# Patient Record
Sex: MALE | Race: WHITE | NOT HISPANIC OR LATINO | URBAN - METROPOLITAN AREA
[De-identification: names, ages, dates, MRNs, and addresses within clinical notes are randomized per-mention and may not be internally consistent; named-entity substitution may affect disease eponyms.]

---

## 2022-07-19 ENCOUNTER — HOSPITAL ENCOUNTER (INPATIENT)
Facility: HOSPITAL | Age: 57
LOS: 1 days | Discharge: HOME | DRG: 433 | End: 2022-07-23
Attending: EMERGENCY MEDICINE | Admitting: HOSPITALIST
Payer: MEDICARE

## 2022-07-19 DIAGNOSIS — K74.60 HEPATIC CIRRHOSIS, UNSPECIFIED HEPATIC CIRRHOSIS TYPE, UNSPECIFIED WHETHER ASCITES PRESENT: ICD-10-CM

## 2022-07-19 DIAGNOSIS — F10.11 HISTORY OF ALCOHOL ABUSE: ICD-10-CM

## 2022-07-19 DIAGNOSIS — K74.60 DECOMPENSATED HEPATIC CIRRHOSIS: Primary | ICD-10-CM

## 2022-07-19 DIAGNOSIS — R18.8 ASCITES OF LIVER: ICD-10-CM

## 2022-07-19 DIAGNOSIS — K72.90 LIVER FAILURE: ICD-10-CM

## 2022-07-19 DIAGNOSIS — K72.90 DECOMPENSATED HEPATIC CIRRHOSIS: Primary | ICD-10-CM

## 2022-07-19 DIAGNOSIS — R06.02 SOB (SHORTNESS OF BREATH): ICD-10-CM

## 2022-07-19 LAB
ALBUMIN SERPL BCP-MCNC: 2.9 G/DL (ref 3.5–5.2)
ALP SERPL-CCNC: 432 U/L (ref 55–135)
ALT SERPL W/O P-5'-P-CCNC: 24 U/L (ref 10–44)
ANION GAP SERPL CALC-SCNC: 12 MMOL/L (ref 8–16)
AST SERPL-CCNC: 81 U/L (ref 10–40)
BASOPHILS # BLD AUTO: 0.05 K/UL (ref 0–0.2)
BASOPHILS NFR BLD: 0.9 % (ref 0–1.9)
BILIRUB SERPL-MCNC: 2.2 MG/DL (ref 0.1–1)
BUN SERPL-MCNC: 11 MG/DL (ref 6–20)
CALCIUM SERPL-MCNC: 9.5 MG/DL (ref 8.7–10.5)
CHLORIDE SERPL-SCNC: 91 MMOL/L (ref 95–110)
CO2 SERPL-SCNC: 30 MMOL/L (ref 23–29)
CREAT SERPL-MCNC: 0.7 MG/DL (ref 0.5–1.4)
DIFFERENTIAL METHOD: ABNORMAL
EOSINOPHIL # BLD AUTO: 0.2 K/UL (ref 0–0.5)
EOSINOPHIL NFR BLD: 3.6 % (ref 0–8)
ERYTHROCYTE [DISTWIDTH] IN BLOOD BY AUTOMATED COUNT: 22.3 % (ref 11.5–14.5)
EST. GFR  (AFRICAN AMERICAN): >60 ML/MIN/1.73 M^2
EST. GFR  (NON AFRICAN AMERICAN): >60 ML/MIN/1.73 M^2
GLUCOSE SERPL-MCNC: 98 MG/DL (ref 70–110)
HCT VFR BLD AUTO: 34.4 % (ref 40–54)
HGB BLD-MCNC: 11 G/DL (ref 14–18)
IMM GRANULOCYTES # BLD AUTO: 0.02 K/UL (ref 0–0.04)
IMM GRANULOCYTES NFR BLD AUTO: 0.4 % (ref 0–0.5)
INR PPP: 1.3 (ref 0.8–1.2)
LYMPHOCYTES # BLD AUTO: 0.4 K/UL (ref 1–4.8)
LYMPHOCYTES NFR BLD: 8.1 % (ref 18–48)
MCH RBC QN AUTO: 27.8 PG (ref 27–31)
MCHC RBC AUTO-ENTMCNC: 32 G/DL (ref 32–36)
MCV RBC AUTO: 87 FL (ref 82–98)
MONOCYTES # BLD AUTO: 1.1 K/UL (ref 0.3–1)
MONOCYTES NFR BLD: 20.3 % (ref 4–15)
NEUTROPHILS # BLD AUTO: 3.6 K/UL (ref 1.8–7.7)
NEUTROPHILS NFR BLD: 66.7 % (ref 38–73)
NRBC BLD-RTO: 0 /100 WBC
PLATELET # BLD AUTO: 220 K/UL (ref 150–450)
PMV BLD AUTO: 10.9 FL (ref 9.2–12.9)
POTASSIUM SERPL-SCNC: 3.2 MMOL/L (ref 3.5–5.1)
PROT SERPL-MCNC: 9.3 G/DL (ref 6–8.4)
PROTHROMBIN TIME: 13.8 SEC (ref 9–12.5)
RBC # BLD AUTO: 3.96 M/UL (ref 4.6–6.2)
SODIUM SERPL-SCNC: 133 MMOL/L (ref 136–145)
WBC # BLD AUTO: 5.33 K/UL (ref 3.9–12.7)

## 2022-07-19 PROCEDURE — 99285 EMERGENCY DEPT VISIT HI MDM: CPT | Mod: 25

## 2022-07-19 PROCEDURE — U0002 COVID-19 LAB TEST NON-CDC: HCPCS | Performed by: EMERGENCY MEDICINE

## 2022-07-19 PROCEDURE — 85610 PROTHROMBIN TIME: CPT | Performed by: EMERGENCY MEDICINE

## 2022-07-19 PROCEDURE — G0378 HOSPITAL OBSERVATION PER HR: HCPCS

## 2022-07-19 PROCEDURE — 85025 COMPLETE CBC W/AUTO DIFF WBC: CPT | Performed by: EMERGENCY MEDICINE

## 2022-07-19 PROCEDURE — 80053 COMPREHEN METABOLIC PANEL: CPT | Performed by: EMERGENCY MEDICINE

## 2022-07-19 PROCEDURE — 99285 EMERGENCY DEPT VISIT HI MDM: CPT | Mod: CS,,, | Performed by: EMERGENCY MEDICINE

## 2022-07-19 PROCEDURE — 99285 PR EMERGENCY DEPT VISIT,LEVEL V: ICD-10-PCS | Mod: CS,,, | Performed by: EMERGENCY MEDICINE

## 2022-07-19 PROCEDURE — 87389 HIV-1 AG W/HIV-1&-2 AB AG IA: CPT | Performed by: PHYSICIAN ASSISTANT

## 2022-07-19 PROCEDURE — 86803 HEPATITIS C AB TEST: CPT | Performed by: PHYSICIAN ASSISTANT

## 2022-07-19 RX ORDER — ACETAMINOPHEN 325 MG/1
325 TABLET ORAL EVERY 4 HOURS PRN
Status: DISCONTINUED | OUTPATIENT
Start: 2022-07-20 | End: 2022-07-23 | Stop reason: HOSPADM

## 2022-07-19 RX ORDER — NALOXONE HCL 0.4 MG/ML
0.02 VIAL (ML) INJECTION
Status: DISCONTINUED | OUTPATIENT
Start: 2022-07-20 | End: 2022-07-23 | Stop reason: HOSPADM

## 2022-07-19 RX ORDER — SODIUM CHLORIDE 0.9 % (FLUSH) 0.9 %
10 SYRINGE (ML) INJECTION
Status: DISCONTINUED | OUTPATIENT
Start: 2022-07-20 | End: 2022-07-23 | Stop reason: HOSPADM

## 2022-07-19 RX ORDER — IBUPROFEN 200 MG
24 TABLET ORAL
Status: DISCONTINUED | OUTPATIENT
Start: 2022-07-20 | End: 2022-07-23 | Stop reason: HOSPADM

## 2022-07-19 RX ORDER — IPRATROPIUM BROMIDE AND ALBUTEROL SULFATE 2.5; .5 MG/3ML; MG/3ML
3 SOLUTION RESPIRATORY (INHALATION) EVERY 6 HOURS PRN
Status: DISCONTINUED | OUTPATIENT
Start: 2022-07-20 | End: 2022-07-23 | Stop reason: HOSPADM

## 2022-07-19 RX ORDER — TALC
6 POWDER (GRAM) TOPICAL NIGHTLY PRN
Status: DISCONTINUED | OUTPATIENT
Start: 2022-07-20 | End: 2022-07-23 | Stop reason: HOSPADM

## 2022-07-19 RX ORDER — POTASSIUM CHLORIDE 20 MEQ/1
40 TABLET, EXTENDED RELEASE ORAL ONCE
Status: COMPLETED | OUTPATIENT
Start: 2022-07-20 | End: 2022-07-20

## 2022-07-19 RX ORDER — IBUPROFEN 200 MG
16 TABLET ORAL
Status: DISCONTINUED | OUTPATIENT
Start: 2022-07-20 | End: 2022-07-23 | Stop reason: HOSPADM

## 2022-07-19 NOTE — FIRST PROVIDER EVALUATION
Medical screening exam completed.  I have conducted a focused provider triage encounter, findings are as follows:    Brief history of present illness:  55 y/o h/o cirrhosis, c/o worsening abd pain and distention, from [place] - never been here before    There were no vitals filed for this visit.    Pertinent physical exam:  Comfortable, appears chronically ill, abd distended    Brief workup plan:  labs    Preliminary workup initiated; this workup will be continued and followed by the physician or advanced practice provider that is assigned to the patient when roomed.    8:49 PM  Pt reporting feeling more SOB  Repeat O2 sat 90%  Placed on oxygen  CXR ordered

## 2022-07-20 PROBLEM — R17 ELEVATED BILIRUBIN: Status: ACTIVE | Noted: 2022-07-20

## 2022-07-20 PROBLEM — E87.6 HYPOKALEMIA: Status: ACTIVE | Noted: 2022-07-20

## 2022-07-20 PROBLEM — F10.11 HISTORY OF ALCOHOL ABUSE: Status: ACTIVE | Noted: 2022-07-20

## 2022-07-20 LAB
ALBUMIN FLD-MCNC: 0.5 G/DL
ALBUMIN SERPL BCP-MCNC: 2.3 G/DL (ref 3.5–5.2)
ALP SERPL-CCNC: 341 U/L (ref 55–135)
ALT SERPL W/O P-5'-P-CCNC: 20 U/L (ref 10–44)
AMMONIA PLAS-SCNC: 103 UMOL/L (ref 10–50)
ANION GAP SERPL CALC-SCNC: 9 MMOL/L (ref 8–16)
APPEARANCE FLD: CLEAR
AST SERPL-CCNC: 72 U/L (ref 10–40)
BASOPHILS # BLD AUTO: 0.05 K/UL (ref 0–0.2)
BASOPHILS NFR BLD: 1 % (ref 0–1.9)
BILIRUB SERPL-MCNC: 1.5 MG/DL (ref 0.1–1)
BODY FLD TYPE: NORMAL
BODY FLUID SOURCE, LDH: NORMAL
BUN SERPL-MCNC: 10 MG/DL (ref 6–20)
CALCIUM SERPL-MCNC: 8.7 MG/DL (ref 8.7–10.5)
CHLORIDE SERPL-SCNC: 93 MMOL/L (ref 95–110)
CO2 SERPL-SCNC: 29 MMOL/L (ref 23–29)
COLOR FLD: YELLOW
CREAT SERPL-MCNC: 0.7 MG/DL (ref 0.5–1.4)
DIFFERENTIAL METHOD: ABNORMAL
EOSINOPHIL # BLD AUTO: 0.2 K/UL (ref 0–0.5)
EOSINOPHIL NFR BLD: 3.4 % (ref 0–8)
ERYTHROCYTE [DISTWIDTH] IN BLOOD BY AUTOMATED COUNT: 22.1 % (ref 11.5–14.5)
EST. GFR  (AFRICAN AMERICAN): >60 ML/MIN/1.73 M^2
EST. GFR  (NON AFRICAN AMERICAN): >60 ML/MIN/1.73 M^2
GLUCOSE SERPL-MCNC: 81 MG/DL (ref 70–110)
GRAM STN SPEC: NORMAL
GRAM STN SPEC: NORMAL
HCT VFR BLD AUTO: 30.3 % (ref 40–54)
HCV AB SERPL QL IA: NEGATIVE
HGB BLD-MCNC: 9.7 G/DL (ref 14–18)
HIV 1+2 AB+HIV1 P24 AG SERPL QL IA: NEGATIVE
IMM GRANULOCYTES # BLD AUTO: 0.01 K/UL (ref 0–0.04)
IMM GRANULOCYTES NFR BLD AUTO: 0.2 % (ref 0–0.5)
INR PPP: 1.4 (ref 0.8–1.2)
LDH FLD L TO P-CCNC: 55 U/L
LYMPHOCYTES # BLD AUTO: 0.4 K/UL (ref 1–4.8)
LYMPHOCYTES NFR BLD: 7.6 % (ref 18–48)
LYMPHOCYTES NFR FLD MANUAL: 37 %
MAGNESIUM SERPL-MCNC: 1.8 MG/DL (ref 1.6–2.6)
MCH RBC QN AUTO: 27.4 PG (ref 27–31)
MCHC RBC AUTO-ENTMCNC: 32 G/DL (ref 32–36)
MCV RBC AUTO: 86 FL (ref 82–98)
MESOTHL CELL NFR FLD MANUAL: 12 %
MONOCYTES # BLD AUTO: 0.9 K/UL (ref 0.3–1)
MONOCYTES NFR BLD: 18.9 % (ref 4–15)
MONOS+MACROS NFR FLD MANUAL: 50 %
NEUTROPHILS # BLD AUTO: 3.4 K/UL (ref 1.8–7.7)
NEUTROPHILS NFR BLD: 68.9 % (ref 38–73)
NEUTROPHILS NFR FLD MANUAL: 1 %
NRBC BLD-RTO: 0 /100 WBC
PLATELET # BLD AUTO: 184 K/UL (ref 150–450)
PMV BLD AUTO: 10.5 FL (ref 9.2–12.9)
POTASSIUM SERPL-SCNC: 3.1 MMOL/L (ref 3.5–5.1)
PROT FLD-MCNC: 1.1 G/DL
PROT SERPL-MCNC: 7.4 G/DL (ref 6–8.4)
PROTHROMBIN TIME: 14 SEC (ref 9–12.5)
RBC # BLD AUTO: 3.54 M/UL (ref 4.6–6.2)
SARS-COV-2 RDRP RESP QL NAA+PROBE: NEGATIVE
SODIUM SERPL-SCNC: 131 MMOL/L (ref 136–145)
SPECIMEN SOURCE: NORMAL
SPECIMEN SOURCE: NORMAL
WBC # BLD AUTO: 4.97 K/UL (ref 3.9–12.7)
WBC # FLD: 57 /CU MM

## 2022-07-20 PROCEDURE — 82042 OTHER SOURCE ALBUMIN QUAN EA: CPT | Performed by: HOSPITALIST

## 2022-07-20 PROCEDURE — G0378 HOSPITAL OBSERVATION PER HR: HCPCS

## 2022-07-20 PROCEDURE — 25000003 PHARM REV CODE 250: Performed by: HOSPITALIST

## 2022-07-20 PROCEDURE — 80321 ALCOHOLS BIOMARKERS 1OR 2: CPT | Performed by: HOSPITALIST

## 2022-07-20 PROCEDURE — 99204 PR OFFICE/OUTPT VISIT, NEW, LEVL IV, 45-59 MIN: ICD-10-PCS | Mod: GC,,, | Performed by: INTERNAL MEDICINE

## 2022-07-20 PROCEDURE — 87205 SMEAR GRAM STAIN: CPT | Performed by: HOSPITALIST

## 2022-07-20 PROCEDURE — 99220 PR INITIAL OBSERVATION CARE,LEVL III: CPT | Mod: ,,, | Performed by: HOSPITALIST

## 2022-07-20 PROCEDURE — 84157 ASSAY OF PROTEIN OTHER: CPT | Performed by: HOSPITALIST

## 2022-07-20 PROCEDURE — 87075 CULTR BACTERIA EXCEPT BLOOD: CPT | Performed by: HOSPITALIST

## 2022-07-20 PROCEDURE — 25500020 PHARM REV CODE 255: Performed by: HOSPITALIST

## 2022-07-20 PROCEDURE — 99204 OFFICE O/P NEW MOD 45 MIN: CPT | Mod: GC,,, | Performed by: INTERNAL MEDICINE

## 2022-07-20 PROCEDURE — 99220 PR INITIAL OBSERVATION CARE,LEVL III: ICD-10-PCS | Mod: ,,, | Performed by: HOSPITALIST

## 2022-07-20 PROCEDURE — 89051 BODY FLUID CELL COUNT: CPT | Performed by: HOSPITALIST

## 2022-07-20 PROCEDURE — 83615 LACTATE (LD) (LDH) ENZYME: CPT | Performed by: HOSPITALIST

## 2022-07-20 PROCEDURE — 83735 ASSAY OF MAGNESIUM: CPT | Performed by: HOSPITALIST

## 2022-07-20 PROCEDURE — 85025 COMPLETE CBC W/AUTO DIFF WBC: CPT | Performed by: HOSPITALIST

## 2022-07-20 PROCEDURE — 85610 PROTHROMBIN TIME: CPT | Performed by: HOSPITALIST

## 2022-07-20 PROCEDURE — 36415 COLL VENOUS BLD VENIPUNCTURE: CPT | Performed by: HOSPITALIST

## 2022-07-20 PROCEDURE — 80053 COMPREHEN METABOLIC PANEL: CPT | Performed by: HOSPITALIST

## 2022-07-20 PROCEDURE — 87070 CULTURE OTHR SPECIMN AEROBIC: CPT | Performed by: HOSPITALIST

## 2022-07-20 PROCEDURE — 82140 ASSAY OF AMMONIA: CPT | Performed by: HOSPITALIST

## 2022-07-20 RX ORDER — LACTULOSE 10 G/15ML
20 SOLUTION ORAL 3 TIMES DAILY
Status: DISCONTINUED | OUTPATIENT
Start: 2022-07-20 | End: 2022-07-21

## 2022-07-20 RX ORDER — SPIRONOLACTONE 50 MG/1
100 TABLET, FILM COATED ORAL DAILY
Status: DISCONTINUED | OUTPATIENT
Start: 2022-07-20 | End: 2022-07-21

## 2022-07-20 RX ORDER — OXYCODONE HYDROCHLORIDE 10 MG/1
10 TABLET ORAL
COMMUNITY
Start: 2022-07-08

## 2022-07-20 RX ORDER — PROPRANOLOL HYDROCHLORIDE 10 MG/1
TABLET ORAL
COMMUNITY

## 2022-07-20 RX ORDER — OXYCODONE HYDROCHLORIDE 10 MG/1
10 TABLET ORAL EVERY 6 HOURS PRN
Status: DISCONTINUED | OUTPATIENT
Start: 2022-07-20 | End: 2022-07-22

## 2022-07-20 RX ORDER — SPIRONOLACTONE 100 MG/1
TABLET, FILM COATED ORAL
Status: ON HOLD | COMMUNITY
End: 2022-07-23 | Stop reason: HOSPADM

## 2022-07-20 RX ORDER — LEVOTHYROXINE SODIUM 200 UG/1
TABLET ORAL
COMMUNITY

## 2022-07-20 RX ORDER — LANOLIN ALCOHOL/MO/W.PET/CERES
CREAM (GRAM) TOPICAL
COMMUNITY

## 2022-07-20 RX ORDER — LEVOTHYROXINE SODIUM 100 UG/1
200 TABLET ORAL
Status: DISCONTINUED | OUTPATIENT
Start: 2022-07-20 | End: 2022-07-23 | Stop reason: HOSPADM

## 2022-07-20 RX ORDER — CYCLOBENZAPRINE HCL 5 MG
10 TABLET ORAL 2 TIMES DAILY PRN
COMMUNITY

## 2022-07-20 RX ORDER — CYCLOBENZAPRINE HCL 5 MG
5 TABLET ORAL 3 TIMES DAILY PRN
Status: DISCONTINUED | OUTPATIENT
Start: 2022-07-20 | End: 2022-07-23 | Stop reason: HOSPADM

## 2022-07-20 RX ORDER — LANOLIN ALCOHOL/MO/W.PET/CERES
400 CREAM (GRAM) TOPICAL ONCE
Status: COMPLETED | OUTPATIENT
Start: 2022-07-20 | End: 2022-07-20

## 2022-07-20 RX ORDER — AMITRIPTYLINE HYDROCHLORIDE 25 MG/1
50 TABLET, FILM COATED ORAL NIGHTLY
Status: DISCONTINUED | OUTPATIENT
Start: 2022-07-20 | End: 2022-07-23 | Stop reason: HOSPADM

## 2022-07-20 RX ORDER — TAMSULOSIN HYDROCHLORIDE 0.4 MG/1
0.4 CAPSULE ORAL DAILY
Status: DISCONTINUED | OUTPATIENT
Start: 2022-07-20 | End: 2022-07-23 | Stop reason: HOSPADM

## 2022-07-20 RX ORDER — LACTULOSE 10 G/15ML
SOLUTION ORAL; RECTAL
Status: ON HOLD | COMMUNITY
End: 2022-07-23 | Stop reason: HOSPADM

## 2022-07-20 RX ORDER — PANTOPRAZOLE SODIUM 40 MG/1
TABLET, DELAYED RELEASE ORAL
COMMUNITY

## 2022-07-20 RX ORDER — TAMSULOSIN HYDROCHLORIDE 0.4 MG/1
CAPSULE ORAL
COMMUNITY

## 2022-07-20 RX ORDER — PANTOPRAZOLE SODIUM 40 MG/1
40 TABLET, DELAYED RELEASE ORAL DAILY
Status: DISCONTINUED | OUTPATIENT
Start: 2022-07-20 | End: 2022-07-23 | Stop reason: HOSPADM

## 2022-07-20 RX ORDER — AMITRIPTYLINE HYDROCHLORIDE 50 MG/1
TABLET, FILM COATED ORAL
COMMUNITY

## 2022-07-20 RX ORDER — PROPRANOLOL HYDROCHLORIDE 10 MG/1
10 TABLET ORAL 2 TIMES DAILY
Status: DISCONTINUED | OUTPATIENT
Start: 2022-07-20 | End: 2022-07-23 | Stop reason: HOSPADM

## 2022-07-20 RX ADMIN — SPIRONOLACTONE 100 MG: 50 TABLET, FILM COATED ORAL at 10:07

## 2022-07-20 RX ADMIN — AMITRIPTYLINE HYDROCHLORIDE 50 MG: 25 TABLET, FILM COATED ORAL at 08:07

## 2022-07-20 RX ADMIN — POTASSIUM CHLORIDE 40 MEQ: 1500 TABLET, EXTENDED RELEASE ORAL at 12:07

## 2022-07-20 RX ADMIN — LACTULOSE 20 G: 20 SOLUTION ORAL at 05:07

## 2022-07-20 RX ADMIN — IOHEXOL 100 ML: 350 INJECTION, SOLUTION INTRAVENOUS at 04:07

## 2022-07-20 RX ADMIN — CYCLOBENZAPRINE HYDROCHLORIDE 5 MG: 5 TABLET, FILM COATED ORAL at 12:07

## 2022-07-20 RX ADMIN — Medication 400 MG: at 10:07

## 2022-07-20 RX ADMIN — OXYCODONE HYDROCHLORIDE 10 MG: 10 TABLET ORAL at 10:07

## 2022-07-20 RX ADMIN — TAMSULOSIN HYDROCHLORIDE 0.4 MG: 0.4 CAPSULE ORAL at 10:07

## 2022-07-20 RX ADMIN — PROPRANOLOL HYDROCHLORIDE 10 MG: 10 TABLET ORAL at 10:07

## 2022-07-20 RX ADMIN — POTASSIUM BICARBONATE 40 MEQ: 391 TABLET, EFFERVESCENT ORAL at 10:07

## 2022-07-20 RX ADMIN — LACTULOSE 20 G: 20 SOLUTION ORAL at 10:07

## 2022-07-20 RX ADMIN — OXYCODONE HYDROCHLORIDE 10 MG: 10 TABLET ORAL at 03:07

## 2022-07-20 RX ADMIN — LEVOTHYROXINE SODIUM 200 MCG: 100 TABLET ORAL at 06:07

## 2022-07-20 RX ADMIN — PROPRANOLOL HYDROCHLORIDE 10 MG: 10 TABLET ORAL at 08:07

## 2022-07-20 RX ADMIN — AMITRIPTYLINE HYDROCHLORIDE 50 MG: 25 TABLET, FILM COATED ORAL at 01:07

## 2022-07-20 RX ADMIN — THERA TABS 1 TABLET: TAB at 10:07

## 2022-07-20 RX ADMIN — PANTOPRAZOLE SODIUM 40 MG: 40 TABLET, DELAYED RELEASE ORAL at 10:07

## 2022-07-20 RX ADMIN — CYCLOBENZAPRINE HYDROCHLORIDE 5 MG: 5 TABLET, FILM COATED ORAL at 05:07

## 2022-07-20 RX ADMIN — POTASSIUM BICARBONATE 40 MEQ: 391 TABLET, EFFERVESCENT ORAL at 08:07

## 2022-07-20 NOTE — ED PROVIDER NOTES
Encounter Date: 7/19/2022       History     Chief Complaint   Patient presents with    Abdominal Pain     Distended, liver failure, pushing my hernia out, prev tx in [place], states has stent that is clogged up     HPI     This is a 56-year-old man with history of cirrhosis, complicated by ascites, for which she has predominantly been followed at a hospital in [place] who drove here today at the recommendation of a doctor there, saying that there was nothing they could do for him in for a anymore.  He has a stent in his liver, he is not sure if it is biliary or vascular, but he notes that his ascites has built up significantly over the past 2 weeks.  He had a large volume paracentesis 2 weeks ago, but it has all recurred and he is feeling short of breath due to this.  He cannot complete his daily activities.  He has not had fevers or chills.    Review of patient's allergies indicates:  Not on File  No past medical history on file.  No past surgical history on file.  No family history on file.     Review of Systems   Constitutional: Negative for chills, diaphoresis, fatigue and fever.   HENT: Negative for congestion, rhinorrhea and sore throat.    Eyes: Negative for visual disturbance.   Respiratory: Negative for cough, chest tightness and shortness of breath.    Cardiovascular: Negative for chest pain.   Gastrointestinal: Positive for abdominal distention and abdominal pain. Negative for blood in stool, constipation, diarrhea and vomiting.   Genitourinary: Negative for dysuria, hematuria and urgency.   Musculoskeletal: Negative for back pain.   Skin: Negative for rash.   Neurological: Negative for seizures and syncope.   Hematological: Does not bruise/bleed easily.   Psychiatric/Behavioral: Negative for agitation and hallucinations.       Physical Exam     Initial Vitals [07/19/22 1801]   BP Pulse Resp Temp SpO2   (!) 146/74 73 18 98.5 °F (36.9 °C) 95 %      MAP       --         Physical Exam  Gen: AxOx3, NAD,  temporal wasting, chronically ill-appearing, appears older than stated age  Eye: EOMI, +scleral icterus, no periorbital edema or ecchymosis  Head: normocephalic, atraumatic, no lesions, scalp appears normal  ENT: neck supple, no stridor, no masses, no drooling or voice changes  CVS: RRR, no m/r/g, distal pulses intact/symmetric  Pulm: CTAB, no wheezes, rales or rhonchi, no increased work of breathing  Abd: soft, distended with ascites, there is an umbilical hernia that is soft and reducible, no significant tenderness, no organomegaly, no CVAT  Ext: no edema, no lesions, rashes, or deformity  Neuro: GCS15, moving all extremities, gait intact, face grossly symmetric  Psych: normal affect, cooperative, well groomed, makes good eye contact  ED Course   Procedures  Labs Reviewed   COMPREHENSIVE METABOLIC PANEL - Abnormal; Notable for the following components:       Result Value    Sodium 133 (*)     Potassium 3.2 (*)     Chloride 91 (*)     CO2 30 (*)     Total Protein 9.3 (*)     Albumin 2.9 (*)     Total Bilirubin 2.2 (*)     Alkaline Phosphatase 432 (*)     AST 81 (*)     All other components within normal limits    Narrative:     Release to patient->Immediate   CBC W/ AUTO DIFFERENTIAL - Abnormal; Notable for the following components:    RBC 3.96 (*)     Hemoglobin 11.0 (*)     Hematocrit 34.4 (*)     RDW 22.3 (*)     Lymph # 0.4 (*)     Mono # 1.1 (*)     Lymph % 8.1 (*)     Mono % 20.3 (*)     All other components within normal limits    Narrative:     Release to patient->Immediate   PROTIME-INR - Abnormal; Notable for the following components:    Prothrombin Time 13.8 (*)     INR 1.3 (*)     All other components within normal limits    Narrative:     Release to patient->Immediate   HIV 1 / 2 ANTIBODY   HEPATITIS C ANTIBODY   SARS-COV-2 (COVID-19) QUALITATIVE PCR   SARS-COV-2 RDRP GENE          Imaging Results    None          Medications - No data to display  Medical Decision Making:   History:   Old Medical  Records: I decided to obtain old medical records.  Old Records Summarized: records from clinic visits.  Initial Assessment:   This is a 56-year-old man with a history of cirrhosis who presents with recurrent, symptomatic ascites.  His oxygen saturation is 92% on room air, I think this is likely due to poor inspiratory effort due to his ascites.  He does not appear encephalopathic, his exam is not consistent with SBP.  I think he needs admission for large volume paracentesis and optimization with hepatology.  His labs are consistent with stigmata of chronic liver disease, no signs of infection.  Plan for observation in the hospital for further management.                      Clinical Impression:   Final diagnoses:  [R06.02] SOB (shortness of breath)  [R18.8] Ascites of liver (Primary)  [K74.60] Hepatic cirrhosis, unspecified hepatic cirrhosis type, unspecified whether ascites present          ED Disposition Condition    Observation               Iman Mc MD  07/19/22 4473

## 2022-07-20 NOTE — ED NOTES
Bed: REU 01  Expected date: 7/19/22  Expected time: 9:28 PM  Means of arrival:   Comments:  Giovanni

## 2022-07-20 NOTE — PLAN OF CARE
Seen this AM after his IR para and improved ascites seen after para this AM with umbilical hernia in abdomen. He has had liver care at multiple hospitals including [facility] in [place]  And [facility] in [place]  And other hospitals in [place]. He has paperwork with him from a [facility] which is the [place] in, but it was only lab work. He reports that he was told to [facility] [place] or [facility] for liver and this was closer but he lives in [place]. He was doing okay he saidu ntil his tips became occluded.  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 He has mild t bili elevation.  K replaced this AM.  He has been taking diuretics at home. He took up to 5 lactulose at home and still wasn't having BMS so he got behind on BMS which is likely cause for higher ammonia. He has never had a script for lactulose enemas to use if this happens and likely needs on dc.  Hepatology consulted and will f/u full recs. Discussions between IR and hepatology this AM about imaging further of MRI vs CT triple phase and will have triple phase to start now to look at TIPS.  He reports that he had it attempted to be revised 5 times with the last being in October. They tried multipel times in the last year before and maybe earlier than that he thinks. He said that they tried at [facility] and at [facility] he belives also. He cant remmeber the exact reason it has not been successful.  Attempts to get to [facility] records in care everywhere not sucessful- he had a different address then so likely is the resaon the 2 records cannot connect. Will likely need to talk to medical records at [facility] to get his care eveyrwhere ID to get them to connect. He has some records he said he can bring tomorrow also in paper.  Discussed with IR Dr Sanford who reports  this is not a good sign if has failed with specialists at this hospital but they will see more after CT triple phase. May be that he establishes care here with IR and liver teams and comes back for f/u planning which is what I told him is a likely plan given these are chronic things.. he metioned that he couldve gotten a para a mile from his house in [place], but these are outpatient things and establishing clinic care for these is not inapproparite once we have worked up fully here and so would not expect a long stay here. May be dc ready tomorrow.

## 2022-07-20 NOTE — HPI
55 yo M with PMHx alcoholic cirrhosis who presents to the ED complaining of abdominal distenstion and discomfort x a few days. Pt. Reports that he has been having worsening abdominal distension since his last paracentesis one month ago that is now causing significant discomfort, difficulty breathing and poor appetite. Pt. Reports that he had a TIPS procedure in the past to prvent the swelling, but the TIPS was not completely successful and he still requires frequent paracentesis. Pt. Denies any current fevers, chills, confusion, nausea, or vomiting. [place] and he is also interested in a liver transplant evaluation at this facility if possible. He denies history of hepatitis. He was told his cirrhosis was a result of alcohol abuse and has not had any alcohol since January of this year.

## 2022-07-20 NOTE — PLAN OF CARE
[facility] - Transplant Stepdown  Initial Discharge Assessment       Primary Care Provider: Dr. Rea (in [place], [place])    Admission Diagnosis: SOB (shortness of breath) [R06.02]  Ascites of liver [R18.8]  Hepatic cirrhosis, unspecified hepatic cirrhosis type, unspecified whether ascites present [K74.60]    Admission Date: 7/19/2022  Expected Discharge Date: 7/20/2022    Discharge Barriers Identified: None    Payor: [facility] MANAGED MCARE / Plan: [facility] MEDICARE COMPLETE / Product Type: Medicare Advantage /     Extended Emergency Contact Information  Primary Emergency Contact: Feliciano Davila  Mobile Phone: [phone]  Relation: Brother   needed? No  Secondary Emergency Contact: Logan Davila  Mobile Phone: [phone]  Relation: Healthcare Power of    needed? No    Discharge Plan A: Home Health  Discharge Plan B: Home with family    No Pharmacies Listed    Initial Assessment (most recent)     Adult Discharge Assessment - 07/20/22 1339        Discharge Assessment    Assessment Type Discharge Planning Assessment     Confirmed/corrected address, phone number and insurance Yes     Confirmed Demographics Correct on Facesheet     Source of Information patient     Reason For Admission decompensated hepatic cirrhosis     Lives With other (see comments)   cares for elderly mother    Facility Arrived From: home     Do you expect to return to your current living situation? Yes     Do you have help at home or someone to help you manage your care at home? Yes     Prior to hospitilization cognitive status: Alert/Oriented     Current cognitive status: Alert/Oriented     Walking or Climbing Stairs Difficulty ambulation difficulty, requires equipment     Dressing/Bathing Difficulty bathing difficulty, requires equipment     Equipment Currently Used at Home cane, samira     Patient currently being followed by outpatient case management? No     Do you currently have service(s) that help you manage  your care at home? No     Who is going to help you get home at discharge? brother     How do you get to doctors appointments? family or friend will provide     Are you on dialysis? No     Do you take coumadin? No     Discharge Plan A Home Health     Discharge Plan B Home with family     DME Needed Upon Discharge  other (see comments)   TBD    Discharge Barriers Identified None               CM spoke with patient in [id number] for DISCHARGE PLANNING ASSESSMENT. Per patient, he lives with his mother in a single family apt with elevator access to point of entry.  Patient was independent with ADLS and DID use a cane as DME or in-home assistive equipment.  Pt is not on dialysis or Coumadin, takes medications as prescribed / keeps refilled / has resources for all daily and prescriptive needs.  Preferred pharmacy is [facility] and 85-Agreeable to bedside delivery.  Will have help from brother and other immediate family upon discharge.  All questions addressed.  Will continue to follow for course of hospitalization.    *Pt reports his brother will provide transportation home at time of d/c.    Jackson Davila RN   [id number]  Case Management

## 2022-07-20 NOTE — CONSULTS
[facility]  Gastroenterology  Consult Note    Patient Name: Robert Davila  MRN: [id number]  Admission Date: 7/19/2022  Hospital Length of Stay: 0 days  Code Status: Full Code   Attending Provider: Promise Galvin MD   Consulting Provider: Donnie Aguilar MD  Primary Care Physician: Primary Doctor No  Principal Problem:Decompensated hepatic cirrhosis    Consults  Subjective:     HPI: Robert Davila is a 56 y.o. male with history of decompensated EtOH cirrhosis (refractory ascites s/p TIPS requiring several revisions) who presents for refractory ascites. Was advised by physician in [place] to present here for further management of his liver disease. Reports ascites has built up over past 2 weeks. Distended and reports SOB due to ascites. Reports routine paracenteses in [place] about every 2 months. Denies fevers, chills.     Reports he does his spironolactone and believes he takes lasix but not listed on any medication lists pt provides. Reports TIPS was done about 4 years ago and has required several attempts at revisions none of which have been successful.  Reports he stopped drinking in January of this year after his sister passed away.  Reports he attended formal rehab programs in the past and is willing and motivated to quit drinking.    PMH: alcoholic cirrhosis  PSH: TIPS s/p several revisions  Social: current smoker, quit alcohol use, no illicit drug use, lives with and takes care of elderly mother in [place]        Social History     Socioeconomic History    Marital status: Single       No current facility-administered medications on file prior to encounter.     Current Outpatient Medications on File Prior to Encounter   Medication Sig Dispense Refill    amitriptyline (ELAVIL) 50 MG tablet amitriptyline 50 mg tablet   TAKE 1 TABLET BY MOUTH AT BEDTIME FOR NERVE PAIN      cyclobenzaprine (FLEXERIL) 5 MG tablet 10 mg 2 (two) times daily as needed.      lactulose (CHRONULAC) 10 gram/15 mL solution  lactulose 20 gram/30 mL oral solution   45 ml po three times daily      levothyroxine (SYNTHROID) 200 MCG tablet levothyroxine 200 mcg tablet   Take 1 tablet every day by oral route.      oxyCODONE (ROXICODONE) 10 mg Tab immediate release tablet Take 10 mg by mouth.      pantoprazole (PROTONIX) 40 MG tablet pantoprazole 40 mg tablet,delayed release   Take 1 tablet every day by oral route.      propranoloL (INDERAL) 10 MG tablet propranolol 10 mg tablet   Take 1 tablet 3 times a day by oral route for 90 days.      spironolactone (ALDACTONE) 100 MG tablet spironolactone 100 mg tablet      tamsulosin (FLOMAX) 0.4 mg Cap tamsulosin 0.4 mg capsule   TAKE 1 CAPSULE BY MOUTH AT BEDTIME      thiamine 100 MG tablet thiamine HCl (vitamin B1) 100 mg tablet   TAKE ONE TABLET BY MOUTH EVERY DAY         Review of patient's allergies indicates:   Allergen Reactions    Biaxin [clarithromycin] Anaphylaxis    Clindamycin Swelling    Penicillins Swelling       ROS     Objective:     Vitals:    07/20/22 1558   BP:    Pulse:    Resp: 18   Temp:          Constitutional:  not in acute distress and well developed  HENT: Head: Normal, normocephalic, atraumatic.  Eyes: conjunctiva clear and sclera nonicteric  Cardiovascular: regular rate and rhythm and no murmur  Respiratory: normal chest expansion & respiratory effort   and no accessory muscle use  GI: soft, distended and nontender  Musculoskeletal: no muscular tenderness noted  Skin: normal color  Neurological: alert, oriented x3  Psychiatric: mood and affect are within normal limits, pt is a good historian; no memory problems were noted    Significant Labs:  Recent Labs   Lab 07/19/22  1927 07/20/22  0612   HGB 11.0* 9.7*       Lab Results   Component Value Date    WBC 4.97 07/20/2022    HGB 9.7 (L) 07/20/2022    HCT 30.3 (L) 07/20/2022    MCV 86 07/20/2022     07/20/2022       Lab Results   Component Value Date     (L) 07/20/2022    K 3.1 (L) 07/20/2022    CL 93  (L) 07/20/2022    CO2 29 07/20/2022    BUN 10 07/20/2022    CREATININE 0.7 07/20/2022    CALCIUM 8.7 07/20/2022    ANIONGAP 9 07/20/2022    ESTGFRAFRICA >60.0 07/20/2022    EGFRNONAA >60.0 07/20/2022       Lab Results   Component Value Date    ALT 20 07/20/2022    AST 72 (H) 07/20/2022    ALKPHOS 341 (H) 07/20/2022    BILITOT 1.5 (H) 07/20/2022       Lab Results   Component Value Date    INR 1.4 (H) 07/20/2022    INR 1.3 (H) 07/19/2022       Significant Imaging:  Reviewed pertinent radiology findings.       Assessment/Plan:     Robert Davila is a 56 y.o. male with history of decompensated EtOH cirrhosis with refractory ascites requiring TIPS and multiple revisions, now with occluded TIPS. Here for refractory ascites. S/p LVP para today. IR obtaining triple phase CT to further evaluate occluded TIPS and any possible chance for revision although seems unlikely with several reported attempts in the past. Would recommend aggressive lactulose given HE and asterixis noted today. Otherwise seems motivated to establish with hepatology clinic and pursue outpatient transplant evaluation.    Problem List:  1. Decompensated alcoholic cirrhosis  2. Occluded TIPS, refractory ascites  3. Hepatic encephalpathy        Recommendations:  - Will complete lab workup for any concomittant cause of cirrhosis. BRIELLE, ASMA, AMA, hepatitis panel, A1AT, iron panel ordered.  - GGT ordered to confirm hepatobiliary source of alk phos elevation. If elevated, would obtain MRI/MRCP to evaluate biliary ducts as well as liver lesion  - Obtain TTE to eval EF in case of TIPS revision  - Continue lactulose, titrate to 2-3 BMs per day. Asterixis noted on exam today  - Will likely establish with hepatology clinic to start transplant eval process as outpatient    Thank you for involving us in the care of Robert Davila. Please call with any additional questions, concerns or changes in the patient's clinical status. We will continue to follow.    Donnie  MD Lauren  Gastroenterology Fellow PGY IV  [facility]

## 2022-07-20 NOTE — SUBJECTIVE & OBJECTIVE
No past medical history on file.    PSHx : TIPS procedure    Review of patient's allergies indicates:  Not on File    No current facility-administered medications on file prior to encounter.     No current outpatient medications on file prior to encounter.     Family History    No reported relevant family history       Tobacco Use    Smoking status: Not on file    Smokeless tobacco: Not on file   Substance and Sexual Activity    Alcohol use: Not on file    Drug use: Not on file    Sexual activity: Not on file     Review of Systems   Constitutional:  Positive for fatigue. Negative for activity change, chills, fever and unexpected weight change.   HENT:  Negative for congestion and sore throat.    Respiratory:  Positive for shortness of breath. Negative for cough and wheezing.    Cardiovascular:  Negative for chest pain, palpitations and leg swelling.   Gastrointestinal:  Positive for abdominal distention and abdominal pain. Negative for blood in stool, nausea and vomiting.   Genitourinary:  Negative for dysuria and hematuria.   Musculoskeletal:  Negative for arthralgias and neck pain.   Skin:  Negative for color change and rash.   Neurological:  Negative for dizziness, seizures and numbness.   Psychiatric/Behavioral:  Negative for hallucinations and suicidal ideas.    Objective:     Vital Signs (Most Recent):  Temp: 98.5 °F (36.9 °C) (07/19/22 1801)  Pulse: 77 (07/19/22 2045)  Resp: 18 (07/19/22 2045)  BP: (!) 123/58 (07/19/22 2045)  SpO2: 95 % (07/19/22 2048)   Vital Signs (24h Range):  Temp:  [98.5 °F (36.9 °C)] 98.5 °F (36.9 °C)  Pulse:  [70-77] 77  Resp:  [16-18] 18  SpO2:  [92 %-95 %] 95 %  BP: (123-146)/(58-74) 123/58     Weight: 74.8 kg (165 lb)  Body mass index is 22.38 kg/m².    Physical Exam  Vitals reviewed.   Constitutional:       General: He is not in acute distress.     Appearance: He is well-developed.   HENT:      Head: Normocephalic and atraumatic.   Eyes:      Extraocular Movements: Extraocular  movements intact.      Pupils: Pupils are equal, round, and reactive to light.   Neck:      Vascular: No JVD.      Trachea: No tracheal deviation.   Cardiovascular:      Rate and Rhythm: Normal rate and regular rhythm.      Heart sounds: No murmur heard.    No friction rub. No gallop.   Pulmonary:      Effort: No respiratory distress.      Breath sounds: Normal breath sounds. No wheezing or rales.   Abdominal:      General: Bowel sounds are normal. There is distension.      Palpations: Abdomen is soft. There is no mass.      Tenderness: There is no abdominal tenderness.      Comments: Tense ascites   Musculoskeletal:         General: No deformity.      Cervical back: Neck supple.      Right lower leg: Edema present.      Left lower leg: Edema present.   Lymphadenopathy:      Cervical: No cervical adenopathy.   Skin:     General: Skin is warm and dry.      Findings: No rash.   Neurological:      Mental Status: He is alert and oriented to person, place, and time.         CRANIAL NERVES     CN III, IV, VI   Pupils are equal, round, and reactive to light.     Significant Labs: All pertinent labs within the past 24 hours have been reviewed.    Significant Imaging: I have reviewed all pertinent imaging results/findings within the past 24 hours.

## 2022-07-20 NOTE — PLAN OF CARE
Patient arrived to MPU for paracentesis. AAOx4, no distress noted, respirations even and unlabored, will continue to monitor. Allergies reviewed. Waiting for eval and consent for paracentesis procedure.

## 2022-07-20 NOTE — CONSULTS
Inpatient Radiology Consult Note    History of Present Illness:  Robert Davila is a 56 y.o. male who presents for occluded TIPS. Despite multiple times of TIPS revision at OSH in the past with the latest on in Oct 2021, pt continue to have recurrent ascites. Ultrasound abdomen on 7/19/22 showed occlusion of R hepatic vein. IR was consulted for evaluation and recommendation of the TIPS.  Admission H&P reviewed.  No past medical history on file.  No past surgical history on file.    Review of Systems:   As documented in primary team H&P    Home Meds:   Prior to Admission medications    Medication Sig Start Date End Date Taking? Authorizing Provider   amitriptyline (ELAVIL) 50 MG tablet amitriptyline 50 mg tablet   TAKE 1 TABLET BY MOUTH AT BEDTIME FOR NERVE PAIN   Yes Historical Provider   cyclobenzaprine (FLEXERIL) 5 MG tablet 10 mg 2 (two) times daily as needed.   Yes Historical Provider   lactulose (CHRONULAC) 10 gram/15 mL solution lactulose 20 gram/30 mL oral solution   45 ml po three times daily   Yes Historical Provider   levothyroxine (SYNTHROID) 200 MCG tablet levothyroxine 200 mcg tablet   Take 1 tablet every day by oral route.   Yes Historical Provider   oxyCODONE (ROXICODONE) 10 mg Tab immediate release tablet Take 10 mg by mouth. 7/8/22  Yes Historical Provider   pantoprazole (PROTONIX) 40 MG tablet pantoprazole 40 mg tablet,delayed release   Take 1 tablet every day by oral route.   Yes Historical Provider   propranoloL (INDERAL) 10 MG tablet propranolol 10 mg tablet   Take 1 tablet 3 times a day by oral route for 90 days.   Yes Historical Provider   spironolactone (ALDACTONE) 100 MG tablet spironolactone 100 mg tablet   Yes Historical Provider   tamsulosin (FLOMAX) 0.4 mg Cap tamsulosin 0.4 mg capsule   TAKE 1 CAPSULE BY MOUTH AT BEDTIME   Yes Historical Provider   thiamine 100 MG tablet thiamine HCl (vitamin B1) 100 mg tablet   TAKE ONE TABLET BY MOUTH EVERY DAY   Yes Historical Provider     Scheduled  Meds:    amitriptyline  50 mg Oral QHS    lactulose  20 g Oral TID    levothyroxine  200 mcg Oral Before breakfast    multivitamin  1 tablet Oral Daily    pantoprazole  40 mg Oral Daily    potassium bicarbonate  40 mEq Oral BID    propranoloL  10 mg Oral BID    spironolactone  100 mg Oral Daily    tamsulosin  0.4 mg Oral Daily     Continuous Infusions:   PRN Meds:acetaminophen, albuterol-ipratropium, cyclobenzaprine, glucose, glucose, melatonin, naloxone, oxyCODONE, sodium chloride 0.9%  Anticoagulants/Antiplatelets: no anticoagulation    Allergies: Review of patient's allergies indicates:  Not on File  Sedation Hx: have not been any systemic reactions    Labs:  Recent Labs   Lab 07/20/22  0612   INR 1.4*       Recent Labs   Lab 07/20/22 0612   WBC 4.97   HGB 9.7*   HCT 30.3*   MCV 86         Recent Labs   Lab 07/20/22  0612   GLU 81   *   K 3.1*   CL 93*   CO2 29   BUN 10   CREATININE 0.7   CALCIUM 8.7   MG 1.8   ALT 20   AST 72*   ALBUMIN 2.3*   BILITOT 1.5*         Vitals:  Temp: 98.2 °F (36.8 °C) (07/20/22 1224)  Pulse: 72 (07/20/22 1224)  Resp: 18 (07/20/22 1026)  BP: 135/64 (07/20/22 1224)  SpO2: (!) 89 % (07/20/22 1224)     Physical Exam:  ASA: 3  Mallampati: 3    General: no acute distress  Mental Status: alert and oriented to person, place and time  HEENT: normocephalic, atraumatic  Chest: unlabored breathing  Heart: regular heart rate  Abdomen: nondistended  Extremity: moves all extremities    Plan:   1. Recommend triple-phase CT abdomen  2. Will evaluate after review CT abdomen      Ana Cannon MD PhD  Interventional Radiology  PGY-2

## 2022-07-20 NOTE — ED NOTES
"Robert Davila, a 56 y.o. male presents to the ED w/ complaint of "my cirrhosis is bad and my liver stent is blocked and my hernia is being pushed out by my fluid buildup". States last abdomen tap was 2 weeks ago where 6 L of fluid was removed    Triage note:  Chief Complaint   Patient presents with    Abdominal Pain     Distended, liver failure, pushing my hernia out, prev tx in [place], states has stent that is clogged up     Review of patient's allergies indicates:  Not on File  No past medical history on file.  "

## 2022-07-20 NOTE — PROGRESS NOTES
Notified patient that they were here to get him for US and para.  Patient sleeping and had to be awoken.  Patient prompted several times to walk to stretcher and patient not wanting to get up and asking for his pain medication.  After prompting patient for the 3rd time, transported and RN slid patient onto stretcher.  Notified Dr. Galvin that patient is requesting pain medications from home regiment.  Will continue to monitor patient.

## 2022-07-20 NOTE — PROCEDURES
Radiology Post-Procedure Note    Pre Op Diagnosis: Ascites  Post Op Diagnosis: Same    Procedure: Ultrasound Guided Paracentesis    Procedure performed by: Mara BEAUCHAMP, Mariola     Written Informed Consent Obtained: Yes  Specimen Removed: YES clear yellow  Estimated Blood Loss: Minimal    Findings:   Successful paracentesis.  Albumin administered PRN per protocol.    Patient tolerated procedure well.    Mariola Terry, APRN, FNP  Interventional Radiology  [phone] clinic

## 2022-07-20 NOTE — PLAN OF CARE
Contacted pt Brother Feliciano [phone] to confirm follow up appt location; was advised by the pts brother that pt wants to see Promise Kamar for PCP but she is a hospitalist and advised he would have to see another provider that is in the area for the follow up appt.  Pt is now scheduled and will received txt msg with conformations of appt date and time.        [facility] Follow Up with Robert Alvarado MD  Wednesday Jul 27, 2022 10:00 AM  Please arrive approximately 15 minutes before your scheduled appointment time and ensure that you have a valid government issued ID and your insurance card. ePre-Check is available and completion prior to your arrival will assist with a quicker registration process.    [facility]  [address]   [facility] [id number] [phone]     [facility]

## 2022-07-20 NOTE — ASSESSMENT & PLAN NOTE
MELD-Na score: 16 at 7/19/2022  7:27 PM  MELD score: 12 at 7/19/2022  7:27 PM  Calculated from:  Serum Creatinine: 0.7 mg/dL (Using min of 1 mg/dL) at 7/19/2022  7:27 PM  Serum Sodium: 133 mmol/L at 7/19/2022  7:27 PM  Total Bilirubin: 2.2 mg/dL at 7/19/2022  7:27 PM  INR(ratio): 1.3 at 7/19/2022  7:27 PM  Age: 56 years  -Worsening ascites in setting of decompensated cirrhosis  -IR paracentesis ordered, liver U/S ordered for further evaluation  -hepatology consulted for assistance while admitted, but anticipate pt. Will be able to discharge after paracentesis  -Continue home meds for diuresis and HE prevention

## 2022-07-20 NOTE — PLAN OF CARE
Pt admitted to room 52239 from ER for Liver transplant work up . No acute distress noted on admitted . No slim problems noted. Pt to have Para today in IR, Possible D/c after Para. Bed low Bed rails up x2 call Light within reach Verbal Understanding noted to call Prn

## 2022-07-20 NOTE — H&P
[facility]  [facility] Medicine  History & Physical    Patient Name: Robert Davila  MRN: [id number]  Patient Class: OP- Observation  Admission Date: 7/19/2022  Attending Physician: Iman Mc MD   Primary Care Provider: Primary Doctor No         Patient information was obtained from patient, past medical records and ER records.     Subjective:     Principal Problem:Decompensated hepatic cirrhosis    Chief Complaint:   Chief Complaint   Patient presents with    Abdominal Pain     Distended, liver failure, pushing my hernia out, prev tx in [place], states has stent that is clogged up        HPI: 57 yo M with PMHx alcoholic cirrhosis who presents to the ED complaining of abdominal distenstion and discomfort x a few days. Pt. Reports that he has been having worsening abdominal distension since his last paracentesis one month ago that is now causing significant discomfort, difficulty breathing and poor appetite. Pt. Reports that he had a TIPS procedure in the past to prvent the swelling, but the TIPS was not completely successful and he still requires frequent paracentesis. Pt. Denies any current fevers, chills, confusion, nausea, or vomiting. He states that he recently moved to the [facility] from [place] and he is also interested in a liver transplant evaluation at this facility if possible. He denies history of hepatitis. He was told his cirrhosis was a result of alcohol abuse and has not had any alcohol since January of this year.      No past medical history on file.    PSHx : TIPS procedure    Review of patient's allergies indicates:  Not on File    No current facility-administered medications on file prior to encounter.     No current outpatient medications on file prior to encounter.     Family History    No reported relevant family history       Tobacco Use    Smoking status: Not on file    Smokeless tobacco: Not on file   Substance and Sexual Activity    Alcohol use: Not on file    Drug use:  Not on file    Sexual activity: Not on file     Review of Systems   Constitutional:  Positive for fatigue. Negative for activity change, chills, fever and unexpected weight change.   HENT:  Negative for congestion and sore throat.    Respiratory:  Positive for shortness of breath. Negative for cough and wheezing.    Cardiovascular:  Negative for chest pain, palpitations and leg swelling.   Gastrointestinal:  Positive for abdominal distention and abdominal pain. Negative for blood in stool, nausea and vomiting.   Genitourinary:  Negative for dysuria and hematuria.   Musculoskeletal:  Negative for arthralgias and neck pain.   Skin:  Negative for color change and rash.   Neurological:  Negative for dizziness, seizures and numbness.   Psychiatric/Behavioral:  Negative for hallucinations and suicidal ideas.    Objective:     Vital Signs (Most Recent):  Temp: 98.5 °F (36.9 °C) (07/19/22 1801)  Pulse: 77 (07/19/22 2045)  Resp: 18 (07/19/22 2045)  BP: (!) 123/58 (07/19/22 2045)  SpO2: 95 % (07/19/22 2048)   Vital Signs (24h Range):  Temp:  [98.5 °F (36.9 °C)] 98.5 °F (36.9 °C)  Pulse:  [70-77] 77  Resp:  [16-18] 18  SpO2:  [92 %-95 %] 95 %  BP: (123-146)/(58-74) 123/58     Weight: 74.8 kg (165 lb)  Body mass index is 22.38 kg/m².    Physical Exam  Vitals reviewed.   Constitutional:       General: He is not in acute distress.     Appearance: He is well-developed.   HENT:      Head: Normocephalic and atraumatic.   Eyes:      Extraocular Movements: Extraocular movements intact.      Pupils: Pupils are equal, round, and reactive to light.   Neck:      Vascular: No JVD.      Trachea: No tracheal deviation.   Cardiovascular:      Rate and Rhythm: Normal rate and regular rhythm.      Heart sounds: No murmur heard.    No friction rub. No gallop.   Pulmonary:      Effort: No respiratory distress.      Breath sounds: Normal breath sounds. No wheezing or rales.   Abdominal:      General: Bowel sounds are normal. There is distension.       Palpations: Abdomen is soft. There is no mass.      Tenderness: There is no abdominal tenderness.      Comments: Tense ascites   Musculoskeletal:         General: No deformity.      Cervical back: Neck supple.      Right lower leg: Edema present.      Left lower leg: Edema present.   Lymphadenopathy:      Cervical: No cervical adenopathy.   Skin:     General: Skin is warm and dry.      Findings: No rash.   Neurological:      Mental Status: He is alert and oriented to person, place, and time.         CRANIAL NERVES     CN III, IV, VI   Pupils are equal, round, and reactive to light.     Significant Labs: All pertinent labs within the past 24 hours have been reviewed.    Significant Imaging: I have reviewed all pertinent imaging results/findings within the past 24 hours.    Assessment/Plan:     * Decompensated hepatic cirrhosis  MELD-Na score: 16 at 7/19/2022  7:27 PM  MELD score: 12 at 7/19/2022  7:27 PM  Calculated from:  Serum Creatinine: 0.7 mg/dL (Using min of 1 mg/dL) at 7/19/2022  7:27 PM  Serum Sodium: 133 mmol/L at 7/19/2022  7:27 PM  Total Bilirubin: 2.2 mg/dL at 7/19/2022  7:27 PM  INR(ratio): 1.3 at 7/19/2022  7:27 PM  Age: 56 years  -Worsening ascites in setting of decompensated cirrhosis  -IR paracentesis ordered, liver U/S ordered for further evaluation  -hepatology consulted for assistance while admitted, but anticipate pt. Will be able to discharge after paracentesis  -Continue home meds for diuresis and HE prevention        VTE Risk Mitigation (From admission, onward)         Ordered     IP VTE LOW RISK PATIENT  Once         07/19/22 2340     Place sequential compression device  Until discontinued         07/19/22 2340                   Tyrese Yuan MD  Department of Hospital Medicine   [facility]

## 2022-07-20 NOTE — PROGRESS NOTES
Patient arrived back from Para.  Patient AAOx4, VSS, and in NAD.  Report pain 9/10 to abdomen and Oxycodone given.  Notified brotherFeliciano, and Dr. Galvin that patient is back in his room.  Will continue to monitor patient.

## 2022-07-20 NOTE — PLAN OF CARE
Paracentesis complete. Removed 4200 mL. Patient AAOx4, no distress noted, respirations even and unlabored. Report called to dilip Andre RN. VSS. Pt stable for transfer back to room at this time.

## 2022-07-20 NOTE — H&P
Inpatient Radiology Pre-procedure Note    History of Present Illness:  Robert Davila is a 56 y.o. male who presents for ultrasound guided paracentesis.  Admission H&P reviewed.  No past medical history on file.  No past surgical history on file.    Review of Systems:   As documented in primary team H&P    Home Meds:   Prior to Admission medications    Medication Sig Start Date End Date Taking? Authorizing Provider   amitriptyline (ELAVIL) 50 MG tablet amitriptyline 50 mg tablet   TAKE 1 TABLET BY MOUTH AT BEDTIME FOR NERVE PAIN   Yes Historical Provider   cyclobenzaprine (FLEXERIL) 5 MG tablet 10 mg 2 (two) times daily as needed.   Yes Historical Provider   lactulose (CHRONULAC) 10 gram/15 mL solution lactulose 20 gram/30 mL oral solution   45 ml po three times daily   Yes Historical Provider   levothyroxine (SYNTHROID) 200 MCG tablet levothyroxine 200 mcg tablet   Take 1 tablet every day by oral route.   Yes Historical Provider   oxyCODONE (ROXICODONE) 10 mg Tab immediate release tablet Take 10 mg by mouth. 7/8/22  Yes Historical Provider   pantoprazole (PROTONIX) 40 MG tablet pantoprazole 40 mg tablet,delayed release   Take 1 tablet every day by oral route.   Yes Historical Provider   propranoloL (INDERAL) 10 MG tablet propranolol 10 mg tablet   Take 1 tablet 3 times a day by oral route for 90 days.   Yes Historical Provider   spironolactone (ALDACTONE) 100 MG tablet spironolactone 100 mg tablet   Yes Historical Provider   tamsulosin (FLOMAX) 0.4 mg Cap tamsulosin 0.4 mg capsule   TAKE 1 CAPSULE BY MOUTH AT BEDTIME   Yes Historical Provider   thiamine 100 MG tablet thiamine HCl (vitamin B1) 100 mg tablet   TAKE ONE TABLET BY MOUTH EVERY DAY   Yes Historical Provider     Scheduled Meds:    amitriptyline  50 mg Oral QHS    lactulose  20 g Oral TID    levothyroxine  200 mcg Oral Before breakfast    multivitamin  1 tablet Oral Daily    pantoprazole  40 mg Oral Daily    propranoloL  10 mg Oral BID     spironolactone  100 mg Oral Daily    tamsulosin  0.4 mg Oral Daily     Continuous Infusions:   PRN Meds:acetaminophen, albuterol-ipratropium, cyclobenzaprine, glucose, glucose, melatonin, naloxone, oxyCODONE, sodium chloride 0.9%  Anticoagulants/Antiplatelets: no anticoagulation    Allergies: Review of patient's allergies indicates:  Not on File  Sedation Hx: have not been any systemic reactions    Vitals:  Temp: 98 °F (36.7 °C) (07/20/22 0715)  Pulse: 71 (07/20/22 0715)  Resp: 18 (07/20/22 0715)  BP: 115/71 (07/20/22 0715)  SpO2: 96 % (07/20/22 0715)     Physical Exam:  ASA: 3  Mallampati: n/a    General: no acute distress  Mental Status: alert and oriented to person, place and time  HEENT: normocephalic, atraumatic  Chest: unlabored breathing  Heart: regular heart rate  Abdomen: distended  Extremity: moves all extremities    Plan: ultrasound guided paracentesis  Sedation Plan: local    DAYANA Mendez, CECILIAP  Interventional Radiology  [phone] [facility]

## 2022-07-21 LAB
A1AT SERPL-MCNC: 216 MG/DL (ref 100–190)
ASCENDING AORTA: 2.7 CM
AV INDEX (PROSTH): 0.61
AV MEAN GRADIENT: 4 MMHG
AV PEAK GRADIENT: 9 MMHG
AV VALVE AREA: 1.84 CM2
AV VELOCITY RATIO: 0.6
BSA FOR ECHO PROCEDURE: 1.99 M2
CERULOPLASMIN SERPL-MCNC: 32 MG/DL (ref 15–45)
CV ECHO LV RWT: 0.36 CM
DOP CALC AO PEAK VEL: 1.48 M/S
DOP CALC AO VTI: 32.47 CM
DOP CALC LVOT AREA: 3 CM2
DOP CALC LVOT DIAMETER: 1.96 CM
DOP CALC LVOT PEAK VEL: 0.89 M/S
DOP CALC LVOT STROKE VOLUME: 59.83 CM3
DOP CALCLVOT PEAK VEL VTI: 19.84 CM
E WAVE DECELERATION TIME: 262.96 MSEC
E/A RATIO: 0.79
E/E' RATIO: 9.57 M/S
ECHO LV POSTERIOR WALL: 0.72 CM (ref 0.6–1.1)
EJECTION FRACTION: 60 %
FERRITIN SERPL-MCNC: 58 NG/ML (ref 20–300)
FRACTIONAL SHORTENING: 37 % (ref 28–44)
GGT SERPL-CCNC: 452 U/L (ref 8–55)
HAV IGM SERPL QL IA: NEGATIVE
HBV CORE IGM SERPL QL IA: NEGATIVE
HBV SURFACE AG SERPL QL IA: NEGATIVE
HCV AB SERPL QL IA: NEGATIVE
INTERVENTRICULAR SEPTUM: 0.55 CM (ref 0.6–1.1)
IRON SERPL-MCNC: 31 UG/DL (ref 45–160)
LA MAJOR: 3.65 CM
LA MINOR: 4.67 CM
LA WIDTH: 3.2 CM
LEFT ATRIUM SIZE: 3.9 CM
LEFT ATRIUM VOLUME INDEX MOD: 14.1 ML/M2
LEFT ATRIUM VOLUME INDEX: 21.7 ML/M2
LEFT ATRIUM VOLUME MOD: 28.29 CM3
LEFT ATRIUM VOLUME: 43.47 CM3
LEFT INTERNAL DIMENSION IN SYSTOLE: 2.53 CM (ref 2.1–4)
LEFT VENTRICLE DIASTOLIC VOLUME INDEX: 19.43 ML/M2
LEFT VENTRICLE DIASTOLIC VOLUME: 38.86 ML
LEFT VENTRICLE MASS INDEX: 35 G/M2
LEFT VENTRICLE SYSTOLIC VOLUME INDEX: 11.5 ML/M2
LEFT VENTRICLE SYSTOLIC VOLUME: 22.93 ML
LEFT VENTRICULAR INTERNAL DIMENSION IN DIASTOLE: 4 CM (ref 3.5–6)
LEFT VENTRICULAR MASS: 69.13 G
LV LATERAL E/E' RATIO: 8.38 M/S
LV SEPTAL E/E' RATIO: 11.17 M/S
MV PEAK A VEL: 0.85 M/S
MV PEAK E VEL: 0.67 M/S
PISA TR MAX VEL: 2.05 M/S
RA MAJOR: 2.91 CM
RA PRESSURE: 3 MMHG
RA WIDTH: 2.17 CM
RIGHT VENTRICULAR END-DIASTOLIC DIMENSION: 3.37 CM
RV TISSUE DOPPLER FREE WALL SYSTOLIC VELOCITY 1 (APICAL 4 CHAMBER VIEW): 13.44 CM/S
SATURATED IRON: 10 % (ref 20–50)
SINUS: 3.72 CM
STJ: 3.71 CM
TDI LATERAL: 0.08 M/S
TDI SEPTAL: 0.06 M/S
TDI: 0.07 M/S
TOTAL IRON BINDING CAPACITY: 311 UG/DL (ref 250–450)
TR MAX PG: 17 MMHG
TRANSFERRIN SERPL-MCNC: 210 MG/DL (ref 200–375)
TRANSFERRIN SERPL-MCNC: 210 MG/DL (ref 200–375)
TRICUSPID ANNULAR PLANE SYSTOLIC EXCURSION: 2.09 CM
TV REST PULMONARY ARTERY PRESSURE: 20 MMHG

## 2022-07-21 PROCEDURE — 86235 NUCLEAR ANTIGEN ANTIBODY: CPT | Mod: 91 | Performed by: STUDENT IN AN ORGANIZED HEALTH CARE EDUCATION/TRAINING PROGRAM

## 2022-07-21 PROCEDURE — 86038 ANTINUCLEAR ANTIBODIES: CPT | Performed by: STUDENT IN AN ORGANIZED HEALTH CARE EDUCATION/TRAINING PROGRAM

## 2022-07-21 PROCEDURE — G0378 HOSPITAL OBSERVATION PER HR: HCPCS

## 2022-07-21 PROCEDURE — 25000003 PHARM REV CODE 250: Performed by: FAMILY MEDICINE

## 2022-07-21 PROCEDURE — 99226 PR SUBSEQUENT OBSERVATION CARE,LEVEL III: CPT | Mod: ,,, | Performed by: FAMILY MEDICINE

## 2022-07-21 PROCEDURE — 80074 ACUTE HEPATITIS PANEL: CPT | Performed by: STUDENT IN AN ORGANIZED HEALTH CARE EDUCATION/TRAINING PROGRAM

## 2022-07-21 PROCEDURE — 99214 OFFICE O/P EST MOD 30 MIN: CPT | Mod: GC,,, | Performed by: INTERNAL MEDICINE

## 2022-07-21 PROCEDURE — 25000003 PHARM REV CODE 250: Performed by: HOSPITALIST

## 2022-07-21 PROCEDURE — 36415 COLL VENOUS BLD VENIPUNCTURE: CPT | Performed by: STUDENT IN AN ORGANIZED HEALTH CARE EDUCATION/TRAINING PROGRAM

## 2022-07-21 PROCEDURE — 99214 PR OFFICE/OUTPT VISIT, EST, LEVL IV, 30-39 MIN: ICD-10-PCS | Mod: GC,,, | Performed by: INTERNAL MEDICINE

## 2022-07-21 PROCEDURE — 99226 PR SUBSEQUENT OBSERVATION CARE,LEVEL III: ICD-10-PCS | Mod: ,,, | Performed by: FAMILY MEDICINE

## 2022-07-21 PROCEDURE — 82103 ALPHA-1-ANTITRYPSIN TOTAL: CPT | Performed by: STUDENT IN AN ORGANIZED HEALTH CARE EDUCATION/TRAINING PROGRAM

## 2022-07-21 PROCEDURE — 82728 ASSAY OF FERRITIN: CPT | Performed by: STUDENT IN AN ORGANIZED HEALTH CARE EDUCATION/TRAINING PROGRAM

## 2022-07-21 PROCEDURE — 86256 FLUORESCENT ANTIBODY TITER: CPT | Performed by: STUDENT IN AN ORGANIZED HEALTH CARE EDUCATION/TRAINING PROGRAM

## 2022-07-21 PROCEDURE — 82390 ASSAY OF CERULOPLASMIN: CPT | Performed by: STUDENT IN AN ORGANIZED HEALTH CARE EDUCATION/TRAINING PROGRAM

## 2022-07-21 PROCEDURE — 84466 ASSAY OF TRANSFERRIN: CPT | Performed by: STUDENT IN AN ORGANIZED HEALTH CARE EDUCATION/TRAINING PROGRAM

## 2022-07-21 PROCEDURE — 82977 ASSAY OF GGT: CPT | Performed by: STUDENT IN AN ORGANIZED HEALTH CARE EDUCATION/TRAINING PROGRAM

## 2022-07-21 RX ORDER — SPIRONOLACTONE 50 MG/1
200 TABLET, FILM COATED ORAL DAILY
Status: DISCONTINUED | OUTPATIENT
Start: 2022-07-22 | End: 2022-07-23 | Stop reason: HOSPADM

## 2022-07-21 RX ORDER — LACTULOSE 10 G/15ML
30 SOLUTION ORAL 3 TIMES DAILY
Status: DISCONTINUED | OUTPATIENT
Start: 2022-07-21 | End: 2022-07-23 | Stop reason: HOSPADM

## 2022-07-21 RX ORDER — FUROSEMIDE 40 MG/1
40 TABLET ORAL 2 TIMES DAILY
Status: DISCONTINUED | OUTPATIENT
Start: 2022-07-21 | End: 2022-07-22

## 2022-07-21 RX ADMIN — RIFAXIMIN 550 MG: 550 TABLET ORAL at 07:07

## 2022-07-21 RX ADMIN — LACTULOSE 30 G: 20 SOLUTION ORAL at 07:07

## 2022-07-21 RX ADMIN — FUROSEMIDE 40 MG: 40 TABLET ORAL at 06:07

## 2022-07-21 RX ADMIN — OXYCODONE HYDROCHLORIDE 10 MG: 10 TABLET ORAL at 02:07

## 2022-07-21 RX ADMIN — LEVOTHYROXINE SODIUM 200 MCG: 100 TABLET ORAL at 05:07

## 2022-07-21 RX ADMIN — OXYCODONE HYDROCHLORIDE 10 MG: 10 TABLET ORAL at 12:07

## 2022-07-21 RX ADMIN — SPIRONOLACTONE 100 MG: 50 TABLET, FILM COATED ORAL at 08:07

## 2022-07-21 RX ADMIN — AMITRIPTYLINE HYDROCHLORIDE 50 MG: 25 TABLET, FILM COATED ORAL at 07:07

## 2022-07-21 RX ADMIN — PROPRANOLOL HYDROCHLORIDE 10 MG: 10 TABLET ORAL at 07:07

## 2022-07-21 RX ADMIN — OXYCODONE HYDROCHLORIDE 10 MG: 10 TABLET ORAL at 08:07

## 2022-07-21 RX ADMIN — PROPRANOLOL HYDROCHLORIDE 10 MG: 10 TABLET ORAL at 08:07

## 2022-07-21 RX ADMIN — OXYCODONE HYDROCHLORIDE 10 MG: 10 TABLET ORAL at 07:07

## 2022-07-21 RX ADMIN — TAMSULOSIN HYDROCHLORIDE 0.4 MG: 0.4 CAPSULE ORAL at 08:07

## 2022-07-21 RX ADMIN — LACTULOSE 20 G: 20 SOLUTION ORAL at 08:07

## 2022-07-21 RX ADMIN — THERA TABS 1 TABLET: TAB at 08:07

## 2022-07-21 RX ADMIN — LACTULOSE 20 G: 20 SOLUTION ORAL at 03:07

## 2022-07-21 RX ADMIN — PANTOPRAZOLE SODIUM 40 MG: 40 TABLET, DELAYED RELEASE ORAL at 08:07

## 2022-07-21 NOTE — SUBJECTIVE & OBJECTIVE
Interval History: NAEON. Says he was not taking his diuretics regularly at home. He didn't know. Feels fine.     Review of Systems   Respiratory:  Negative for shortness of breath.    Cardiovascular:  Negative for chest pain.   Gastrointestinal:  Negative for abdominal pain.   Genitourinary:  Negative for dysuria.   Neurological:  Negative for headaches.     Objective:     Vital Signs (Most Recent):  Temp: 98.6 °F (37 °C) (07/21/22 1548)  Pulse: 65 (07/21/22 1548)  Resp: 18 (07/21/22 1409)  BP: 123/60 (07/21/22 1548)  SpO2: (!) 94 % (07/21/22 1145)   Vital Signs (24h Range):  Temp:  [98.1 °F (36.7 °C)-98.6 °F (37 °C)] 98.6 °F (37 °C)  Pulse:  [62-77] 65  Resp:  [14-18] 18  SpO2:  [92 %-96 %] 94 %  BP: (103-123)/(60-71) 123/60     Weight: 78 kg (171 lb 15.3 oz)  Body mass index is 23.32 kg/m².  No intake or output data in the 24 hours ending 07/21/22 1617   Physical Exam  Vitals and nursing note reviewed.   Constitutional:       General: He is not in acute distress.     Appearance: He is well-developed.   HENT:      Head: Normocephalic and atraumatic.   Eyes:      Conjunctiva/sclera: Conjunctivae normal.   Neck:      Vascular: No JVD.   Cardiovascular:      Rate and Rhythm: Normal rate and regular rhythm.      Heart sounds: Normal heart sounds.   Pulmonary:      Effort: Pulmonary effort is normal.      Breath sounds: Normal breath sounds.   Abdominal:      General: Abdomen is protuberant. Bowel sounds are normal. There is distension.      Palpations: Abdomen is soft.   Musculoskeletal:      Cervical back: Neck supple.      Right lower leg: No edema.      Left lower leg: No edema.   Neurological:      Mental Status: He is alert.   Psychiatric:         Behavior: Behavior normal.       Significant Labs: All pertinent labs within the past 24 hours have been reviewed.  CBC:   Recent Labs   Lab 07/19/22  1927 07/20/22  0612   WBC 5.33 4.97   HGB 11.0* 9.7*   HCT 34.4* 30.3*    184     CMP:   Recent Labs   Lab  07/19/22 1927 07/20/22  0612   * 131*   K 3.2* 3.1*   CL 91* 93*   CO2 30* 29   GLU 98 81   BUN 11 10   CREATININE 0.7 0.7   CALCIUM 9.5 8.7   PROT 9.3* 7.4   ALBUMIN 2.9* 2.3*   BILITOT 2.2* 1.5*   ALKPHOS 432* 341*   AST 81* 72*   ALT 24 20   ANIONGAP 12 9   EGFRNONAA >60.0 >60.0     Coagulation:   Recent Labs   Lab 07/20/22  0612   INR 1.4*       Significant Imaging: I have reviewed all pertinent imaging results/findings within the past 24 hours.

## 2022-07-21 NOTE — PROGRESS NOTES
[facility] Medicine  Progress Note    Patient Name: Robert Davila  MRN: [id number]  Patient Class: OP- Observation   Admission Date: 7/19/2022  Length of Stay: 0 days  Attending Physician: Belkys Cordova MD  Primary Care Provider: Primary Doctor No      Subjective:     Principal Problem:Decompensated hepatic cirrhosis      HPI:  57 yo M with PMHx alcoholic cirrhosis who presents to the ED complaining of abdominal distenstion and discomfort x a few days. Pt. Reports that he has been having worsening abdominal distension since his last paracentesis one month ago that is now causing significant discomfort, difficulty breathing and poor appetite. Pt. Reports that he had a TIPS procedure in the past to prvent the swelling, but the TIPS was not completely successful and he still requires frequent paracentesis. Pt. Denies any current fevers, chills, confusion, nausea, or vomiting. [place] he is also interested in a liver transplant evaluation at this facility if possible. He denies history of hepatitis. He was told his cirrhosis was a result of alcohol abuse and has not had any alcohol since January of this year.      Interval History: NAEON. Says he was not taking his diuretics regularly at home. He didn't know. Feels fine.     Review of Systems   Respiratory:  Negative for shortness of breath.    Cardiovascular:  Negative for chest pain.   Gastrointestinal:  Negative for abdominal pain.   Genitourinary:  Negative for dysuria.   Neurological:  Negative for headaches.     Objective:     Vital Signs (Most Recent):  Temp: 98.6 °F (37 °C) (07/21/22 1548)  Pulse: 65 (07/21/22 1548)  Resp: 18 (07/21/22 1409)  BP: 123/60 (07/21/22 1548)  SpO2: (!) 94 % (07/21/22 1145)   Vital Signs (24h Range):  Temp:  [98.1 °F (36.7 °C)-98.6 °F (37 °C)] 98.6 °F (37 °C)  Pulse:  [62-77] 65  Resp:  [14-18] 18  SpO2:  [92 %-96 %] 94 %  BP: (103-123)/(60-71) 123/60      Weight: 78 kg (171 lb 15.3 oz)  Body mass index is 23.32 kg/m².  No intake or output data in the 24 hours ending 07/21/22 1617   Physical Exam  Vitals and nursing note reviewed.   Constitutional:       General: He is not in acute distress.     Appearance: He is well-developed.   HENT:      Head: Normocephalic and atraumatic.   Eyes:      Conjunctiva/sclera: Conjunctivae normal.   Neck:      Vascular: No JVD.   Cardiovascular:      Rate and Rhythm: Normal rate and regular rhythm.      Heart sounds: Normal heart sounds.   Pulmonary:      Effort: Pulmonary effort is normal.      Breath sounds: Normal breath sounds.   Abdominal:      General: Abdomen is protuberant. Bowel sounds are normal. There is distension.      Palpations: Abdomen is soft.   Musculoskeletal:      Cervical back: Neck supple.      Right lower leg: No edema.      Left lower leg: No edema.   Neurological:      Mental Status: He is alert.   Psychiatric:         Behavior: Behavior normal.       Significant Labs: All pertinent labs within the past 24 hours have been reviewed.  CBC:   Recent Labs   Lab 07/19/22 1927 07/20/22  0612   WBC 5.33 4.97   HGB 11.0* 9.7*   HCT 34.4* 30.3*    184     CMP:   Recent Labs   Lab 07/19/22 1927 07/20/22  0612   * 131*   K 3.2* 3.1*   CL 91* 93*   CO2 30* 29   GLU 98 81   BUN 11 10   CREATININE 0.7 0.7   CALCIUM 9.5 8.7   PROT 9.3* 7.4   ALBUMIN 2.9* 2.3*   BILITOT 2.2* 1.5*   ALKPHOS 432* 341*   AST 81* 72*   ALT 24 20   ANIONGAP 12 9   EGFRNONAA >60.0 >60.0     Coagulation:   Recent Labs   Lab 07/20/22  0612   INR 1.4*       Significant Imaging: I have reviewed all pertinent imaging results/findings within the past 24 hours.      Assessment/Plan:      * Decompensated hepatic cirrhosis    -Worsening ascites in setting of decompensated cirrhosis. Per hepatology, now requires paracentesis every 2 months. Discussed with resident-- increase lactulose. Increase dose of diuretics ( lasix 80 mg and  spironolactone 200 mg). Add Rifaximin for HE. Patient to establish with hepatology clinic to start transplant eval process as outpatient.        -s/p successful paracentesis. Removed 4200 mL  - potassium supplementation         VTE Risk Mitigation (From admission, onward)         Ordered     IP VTE LOW RISK PATIENT  Once         07/19/22 2340     Place sequential compression device  Until discontinued         07/19/22 2340                Discharge Planning   AHSAN: 7/20/2022     Code Status: Full Code   Is the patient medically ready for discharge?: No    Reason for patient still in hospital (select all that apply): Patient trending condition, Treatment and Consult recommendations  Discharge Plan A: Home Health          Belkys Cordova MD  Department of Hospital Medicine   [facility]

## 2022-07-21 NOTE — PLAN OF CARE
Patient had para today and 4.2 L off.  Patient receiving home pain medication regiment and patient reports pain well controlled this pm.  Patient had CT scan.  Patient brother updated on plan of care.  Patient to [facility] tomorrow to pursue outpatient workup.

## 2022-07-21 NOTE — PROGRESS NOTES
[facility]  Hepatology  Progress Note    Patient Name: Robert Davila  MRN: [id number]  Admission Date: 7/19/2022      Subjective:     Interval History:  - Seems encephalopathic during our visit, had difficulty recalling and dialing phone number when giving contact info    No current facility-administered medications on file prior to encounter.     Current Outpatient Medications on File Prior to Encounter   Medication Sig Dispense Refill    amitriptyline (ELAVIL) 50 MG tablet amitriptyline 50 mg tablet   TAKE 1 TABLET BY MOUTH AT BEDTIME FOR NERVE PAIN      cyclobenzaprine (FLEXERIL) 5 MG tablet 10 mg 2 (two) times daily as needed.      lactulose (CHRONULAC) 10 gram/15 mL solution lactulose 20 gram/30 mL oral solution   45 ml po three times daily      levothyroxine (SYNTHROID) 200 MCG tablet levothyroxine 200 mcg tablet   Take 1 tablet every day by oral route.      oxyCODONE (ROXICODONE) 10 mg Tab immediate release tablet Take 10 mg by mouth.      pantoprazole (PROTONIX) 40 MG tablet pantoprazole 40 mg tablet,delayed release   Take 1 tablet every day by oral route.      propranoloL (INDERAL) 10 MG tablet propranolol 10 mg tablet   Take 1 tablet 3 times a day by oral route for 90 days.      spironolactone (ALDACTONE) 100 MG tablet spironolactone 100 mg tablet      tamsulosin (FLOMAX) 0.4 mg Cap tamsulosin 0.4 mg capsule   TAKE 1 CAPSULE BY MOUTH AT BEDTIME      thiamine 100 MG tablet thiamine HCl (vitamin B1) 100 mg tablet   TAKE ONE TABLET BY MOUTH EVERY DAY         Review of patient's allergies indicates:   Allergen Reactions    Biaxin [clarithromycin] Anaphylaxis    Clindamycin Swelling    Penicillins Swelling         Objective:     Vitals:    07/21/22 1548   BP: 123/60   Pulse: 65   Resp: 17   Temp: 98.6 °F (37 °C)         Constitutional:  not in acute distress and well developed  HENT: Head: Normal, normocephalic, atraumatic.  Eyes: conjunctiva clear and sclera  nonicteric  Cardiovascular: regular rate and rhythm and no murmur  Respiratory: normal chest expansion & respiratory effort   and no accessory muscle use  GI: soft, distended and nontender  Musculoskeletal: no muscular tenderness noted  Skin: normal color  Neurological: asterixis present  Psychiatric: mood and affect are within normal limits, pt is a good historian; no memory problems were noted    Significant Labs:  Recent Labs   Lab 07/19/22  1927 07/20/22  0612   HGB 11.0* 9.7*       Lab Results   Component Value Date    WBC 4.97 07/20/2022    HGB 9.7 (L) 07/20/2022    HCT 30.3 (L) 07/20/2022    MCV 86 07/20/2022     07/20/2022       Lab Results   Component Value Date     (L) 07/20/2022    K 3.1 (L) 07/20/2022    CL 93 (L) 07/20/2022    CO2 29 07/20/2022    BUN 10 07/20/2022    CREATININE 0.7 07/20/2022    CALCIUM 8.7 07/20/2022    ANIONGAP 9 07/20/2022    ESTGFRAFRICA >60.0 07/20/2022    EGFRNONAA >60.0 07/20/2022       Lab Results   Component Value Date    ALT 20 07/20/2022    AST 72 (H) 07/20/2022     (H) 07/21/2022    ALKPHOS 341 (H) 07/20/2022    BILITOT 1.5 (H) 07/20/2022       Lab Results   Component Value Date    INR 1.4 (H) 07/20/2022    INR 1.3 (H) 07/19/2022       Significant Imaging:  Reviewed pertinent radiology findings.     - Iron panel, ceruloplasmin, viral hepatitis panel wnl    Assessment/Plan:     Robert Davila is a 56 y.o. male with history of decompensated EtOH cirrhosis with refractory ascites requiring TIPS and multiple revisions, now with occluded TIPS. Here for refractory ascites. S/p LVP para today. IR obtaining triple phase CT to further evaluate occluded TIPS and any possible chance for revision although seems unlikely with several reported attempts in the past. Would aggressive lactulose and rifaximin and increase in diuretics. Otherwise seems motivated to establish with hepatology clinic and pursue outpatient transplant evaluation.    Problem  List:  1. Decompensated alcoholic cirrhosis  2. Occluded TIPS, refractory ascites  3. Hepatic encephalpathy        Recommendations:  - Lab workup pending for any concomittant cause of cirrhosis. Negative thus far. BRIELLE, ASMA, AMA, pending  - Continue lactulose, would increase frequency to at least q6h given HE noted clinically today  - For worsening HE, can dose q2h for 2-3 doses until clinical improvement  - Start rifaximin 550 mg BID  - Increase diuretics to lasix 80 mg and spironolactone 200 mg daily  - Will plan to establish with hepatology clinic to start transplant eval process as outpatient     Thank you for involving us in the care of Robert Davila. Please call with any additional questions, concerns or changes in the patient's clinical status. We will continue to follow.    Donnie Aguilar MD  Gastroenterology Fellow PGY IV   [facility]

## 2022-07-21 NOTE — PLAN OF CARE
56 year-old male admitted 7/19/22 for decompensated hepatic cirrhosis/ liver workup. Pt has hx of refractory ascites, previous TIPS, ETOH abuse  -AAOx4, ambulates with standby assistance  -20 G Lt AC  -Lactulose TID  -no BM today  -Echo complete/EF 60%  -PRN Oxy 10mg Q6  -pt lying in bed, bed in lowest position, wheels locked, non-skid socks in place, call light within reach  -pt to complete workup OP

## 2022-07-22 ENCOUNTER — TELEPHONE (OUTPATIENT)
Dept: TRANSPLANT | Facility: CLINIC | Age: 57
End: 2022-07-22
Payer: MEDICARE

## 2022-07-22 LAB
ALBUMIN SERPL BCP-MCNC: 2.3 G/DL (ref 3.5–5.2)
ALP SERPL-CCNC: 335 U/L (ref 55–135)
ALT SERPL W/O P-5'-P-CCNC: 29 U/L (ref 10–44)
ANA SER QL IF: NORMAL
ANION GAP SERPL CALC-SCNC: 9 MMOL/L (ref 8–16)
AST SERPL-CCNC: 89 U/L (ref 10–40)
BILIRUB SERPL-MCNC: 1.6 MG/DL (ref 0.1–1)
BUN SERPL-MCNC: 11 MG/DL (ref 6–20)
CALCIUM SERPL-MCNC: 8.4 MG/DL (ref 8.7–10.5)
CHLORIDE SERPL-SCNC: 97 MMOL/L (ref 95–110)
CO2 SERPL-SCNC: 27 MMOL/L (ref 23–29)
CREAT SERPL-MCNC: 0.8 MG/DL (ref 0.5–1.4)
EST. GFR  (AFRICAN AMERICAN): >60 ML/MIN/1.73 M^2
EST. GFR  (NON AFRICAN AMERICAN): >60 ML/MIN/1.73 M^2
GLUCOSE SERPL-MCNC: 110 MG/DL (ref 70–110)
INR PPP: 1.4 (ref 0.8–1.2)
POTASSIUM SERPL-SCNC: 3.4 MMOL/L (ref 3.5–5.1)
PROT SERPL-MCNC: 7.2 G/DL (ref 6–8.4)
PROTHROMBIN TIME: 14.6 SEC (ref 9–12.5)
SODIUM SERPL-SCNC: 133 MMOL/L (ref 136–145)

## 2022-07-22 PROCEDURE — 99233 PR SUBSEQUENT HOSPITAL CARE,LEVL III: ICD-10-PCS | Mod: GC,,, | Performed by: INTERNAL MEDICINE

## 2022-07-22 PROCEDURE — 85610 PROTHROMBIN TIME: CPT | Performed by: STUDENT IN AN ORGANIZED HEALTH CARE EDUCATION/TRAINING PROGRAM

## 2022-07-22 PROCEDURE — 99233 PR SUBSEQUENT HOSPITAL CARE,LEVL III: ICD-10-PCS | Mod: ,,, | Performed by: FAMILY MEDICINE

## 2022-07-22 PROCEDURE — S4991 NICOTINE PATCH NONLEGEND: HCPCS | Performed by: FAMILY MEDICINE

## 2022-07-22 PROCEDURE — 80053 COMPREHEN METABOLIC PANEL: CPT | Performed by: STUDENT IN AN ORGANIZED HEALTH CARE EDUCATION/TRAINING PROGRAM

## 2022-07-22 PROCEDURE — 25000003 PHARM REV CODE 250: Performed by: HOSPITALIST

## 2022-07-22 PROCEDURE — 36415 COLL VENOUS BLD VENIPUNCTURE: CPT | Performed by: STUDENT IN AN ORGANIZED HEALTH CARE EDUCATION/TRAINING PROGRAM

## 2022-07-22 PROCEDURE — 25000003 PHARM REV CODE 250: Performed by: FAMILY MEDICINE

## 2022-07-22 PROCEDURE — 20600001 HC STEP DOWN PRIVATE ROOM

## 2022-07-22 PROCEDURE — 99233 SBSQ HOSP IP/OBS HIGH 50: CPT | Mod: GC,,, | Performed by: INTERNAL MEDICINE

## 2022-07-22 PROCEDURE — 99233 SBSQ HOSP IP/OBS HIGH 50: CPT | Mod: ,,, | Performed by: FAMILY MEDICINE

## 2022-07-22 RX ORDER — OXYCODONE HYDROCHLORIDE 10 MG/1
10 TABLET ORAL EVERY 12 HOURS PRN
Status: DISCONTINUED | OUTPATIENT
Start: 2022-07-22 | End: 2022-07-23 | Stop reason: HOSPADM

## 2022-07-22 RX ORDER — FUROSEMIDE 40 MG/1
80 TABLET ORAL DAILY
Status: DISCONTINUED | OUTPATIENT
Start: 2022-07-23 | End: 2022-07-23 | Stop reason: HOSPADM

## 2022-07-22 RX ORDER — OXYCODONE HYDROCHLORIDE 5 MG/1
5 TABLET ORAL EVERY 12 HOURS PRN
Status: DISCONTINUED | OUTPATIENT
Start: 2022-07-22 | End: 2022-07-22

## 2022-07-22 RX ORDER — FUROSEMIDE 40 MG/1
40 TABLET ORAL 2 TIMES DAILY
Status: DISPENSED | OUTPATIENT
Start: 2022-07-22 | End: 2022-07-23

## 2022-07-22 RX ORDER — IBUPROFEN 200 MG
1 TABLET ORAL DAILY
Status: DISCONTINUED | OUTPATIENT
Start: 2022-07-22 | End: 2022-07-23 | Stop reason: HOSPADM

## 2022-07-22 RX ORDER — MAG HYDROX/ALUMINUM HYD/SIMETH 200-200-20
30 SUSPENSION, ORAL (FINAL DOSE FORM) ORAL EVERY 6 HOURS PRN
Status: DISCONTINUED | OUTPATIENT
Start: 2022-07-22 | End: 2022-07-23 | Stop reason: HOSPADM

## 2022-07-22 RX ADMIN — RIFAXIMIN 550 MG: 550 TABLET ORAL at 09:07

## 2022-07-22 RX ADMIN — CYCLOBENZAPRINE HYDROCHLORIDE 5 MG: 5 TABLET, FILM COATED ORAL at 04:07

## 2022-07-22 RX ADMIN — LACTULOSE 30 G: 20 SOLUTION ORAL at 02:07

## 2022-07-22 RX ADMIN — PROPRANOLOL HYDROCHLORIDE 10 MG: 10 TABLET ORAL at 07:07

## 2022-07-22 RX ADMIN — NICOTINE 1 PATCH: 14 PATCH, EXTENDED RELEASE TRANSDERMAL at 12:07

## 2022-07-22 RX ADMIN — SPIRONOLACTONE 200 MG: 50 TABLET, FILM COATED ORAL at 09:07

## 2022-07-22 RX ADMIN — RIFAXIMIN 550 MG: 550 TABLET ORAL at 07:07

## 2022-07-22 RX ADMIN — ACETAMINOPHEN 325 MG: 325 TABLET ORAL at 04:07

## 2022-07-22 RX ADMIN — OXYCODONE HYDROCHLORIDE 10 MG: 10 TABLET ORAL at 01:07

## 2022-07-22 RX ADMIN — THERA TABS 1 TABLET: TAB at 09:07

## 2022-07-22 RX ADMIN — PANTOPRAZOLE SODIUM 40 MG: 40 TABLET, DELAYED RELEASE ORAL at 09:07

## 2022-07-22 RX ADMIN — LEVOTHYROXINE SODIUM 200 MCG: 100 TABLET ORAL at 05:07

## 2022-07-22 RX ADMIN — OXYCODONE HYDROCHLORIDE 10 MG: 10 TABLET ORAL at 07:07

## 2022-07-22 RX ADMIN — CYCLOBENZAPRINE HYDROCHLORIDE 5 MG: 5 TABLET, FILM COATED ORAL at 05:07

## 2022-07-22 RX ADMIN — ALUMINUM HYDROXIDE, MAGNESIUM HYDROXIDE, AND SIMETHICONE 30 ML: 200; 200; 20 SUSPENSION ORAL at 11:07

## 2022-07-22 RX ADMIN — CYCLOBENZAPRINE HYDROCHLORIDE 5 MG: 5 TABLET, FILM COATED ORAL at 12:07

## 2022-07-22 RX ADMIN — LACTULOSE 30 G: 20 SOLUTION ORAL at 07:07

## 2022-07-22 RX ADMIN — FUROSEMIDE 40 MG: 40 TABLET ORAL at 09:07

## 2022-07-22 RX ADMIN — LACTULOSE 30 G: 20 SOLUTION ORAL at 09:07

## 2022-07-22 RX ADMIN — TAMSULOSIN HYDROCHLORIDE 0.4 MG: 0.4 CAPSULE ORAL at 09:07

## 2022-07-22 RX ADMIN — PROPRANOLOL HYDROCHLORIDE 10 MG: 10 TABLET ORAL at 09:07

## 2022-07-22 RX ADMIN — AMITRIPTYLINE HYDROCHLORIDE 50 MG: 25 TABLET, FILM COATED ORAL at 07:07

## 2022-07-22 NOTE — TELEPHONE ENCOUNTER
..  Patient: Robert Davila       MRN: [id number]      : 1965     Age: 56 y.o.  [address] [facility] [address]    Providers  Hepatologists: Bobbi Luis MD      Priority of review: Other/IS TIPS an Option?    Patient Transplant Status: Other Outpatient Evaluation requested    Reason for presentation: Reassessment    Clinical Summary:  56 y.o. male with history of decompensated EtOH cirrhosis with refractory ascites requiring TIPS and multiple revisions, now with occluded TIPS. PVT with cavernous transformation. Is TIPS Revision possible?    Imaging to be reviewed: CT abdomen    HCC Treatment History: N/A    ABO: N/A    Platelets:   Lab Results   Component Value Date/Time     2022 06:12 AM     Creatinine:   Lab Results   Component Value Date/Time    CREATININE 0.8 2022 11:00 AM     Bilirubin:   Lab Results   Component Value Date/Time    BILITOT 1.6 (H) 2022 11:00 AM     AFP Last 3 each if available: No results found for: AFP, EXTAFP    MELD: MELD-Na score: 16 at 2022 11:00 AM  MELD score: 12 at 2022 11:00 AM  Calculated from:  Serum Creatinine: 0.8 mg/dL (Using min of 1 mg/dL) at 2022 11:00 AM  Serum Sodium: 133 mmol/L at 2022 11:00 AM  Total Bilirubin: 1.6 mg/dL at 2022 11:00 AM  INR(ratio): 1.4 at 2022 11:00 AM  Age: 56 years    Plan:     Follow-up Provider:

## 2022-07-22 NOTE — SUBJECTIVE & OBJECTIVE
Interval History: MICHAEL Gallardos his oxycodone continued-- has been on it for 5 years.     Review of Systems   Respiratory:  Negative for shortness of breath.    Cardiovascular:  Negative for chest pain.   Gastrointestinal:  Negative for abdominal pain.   Genitourinary:  Negative for dysuria.   Neurological:  Negative for headaches.     Objective:     Vital Signs (Most Recent):  Temp: 98.1 °F (36.7 °C) (07/22/22 1130)  Pulse: 70 (07/22/22 1130)  Resp: 18 (07/22/22 1130)  BP: 128/74 (07/22/22 1130)  SpO2: (!) 94 % (07/22/22 1130)   Vital Signs (24h Range):  Temp:  [97.5 °F (36.4 °C)-98.3 °F (36.8 °C)] 98.1 °F (36.7 °C)  Pulse:  [65-75] 70  Resp:  [17-20] 18  SpO2:  [93 %-95 %] 94 %  BP: (122-131)/(60-84) 128/74     Weight: 78 kg (171 lb 15.3 oz)  Body mass index is 23.32 kg/m².    Intake/Output Summary (Last 24 hours) at 7/22/2022 1723  Last data filed at 7/21/2022 1800  Gross per 24 hour   Intake 240 ml   Output --   Net 240 ml        Physical Exam  Vitals and nursing note reviewed.   Constitutional:       General: He is not in acute distress.     Appearance: He is well-developed.   HENT:      Head: Normocephalic and atraumatic.   Eyes:      Conjunctiva/sclera: Conjunctivae normal.   Neck:      Vascular: No JVD.   Cardiovascular:      Rate and Rhythm: Normal rate and regular rhythm.      Heart sounds: Normal heart sounds.   Pulmonary:      Effort: Pulmonary effort is normal.      Breath sounds: Normal breath sounds.   Abdominal:      General: Abdomen is protuberant. Bowel sounds are normal. There is distension.      Palpations: Abdomen is soft.   Musculoskeletal:      Cervical back: Neck supple.      Right lower leg: No edema.      Left lower leg: No edema.   Neurological:      Mental Status: He is alert.   Psychiatric:         Behavior: Behavior normal.       Significant Labs: All pertinent labs within the past 24 hours have been reviewed.  CBC:   No results for input(s): WBC, HGB, HCT, PLT in the last 48  hours.    CMP:   Recent Labs   Lab 07/22/22  1100   *   K 3.4*   CL 97   CO2 27      BUN 11   CREATININE 0.8   CALCIUM 8.4*   PROT 7.2   ALBUMIN 2.3*   BILITOT 1.6*   ALKPHOS 335*   AST 89*   ALT 29   ANIONGAP 9   EGFRNONAA >60.0       Coagulation:   Recent Labs   Lab 07/22/22  1100   INR 1.4*         Significant Imaging: I have reviewed all pertinent imaging results/findings within the past 24 hours.

## 2022-07-22 NOTE — PLAN OF CARE
Patient AAO x4, though sometimes noted to repeat himself. Patient reported not having BM in several days - 2 BM so far this shift. Lactulose continued TID. PRN oxycodone changed to Q12H as needed. Nicotine patch ordered per patient request.

## 2022-07-22 NOTE — PROGRESS NOTES
[facility] Medicine  Progress Note    Patient Name: Robert Davila  MRN: [id number]  Patient Class: IP- Inpatient   Admission Date: 7/19/2022  Length of Stay: 0 days  Attending Physician: Belkys Cordova MD  Primary Care Provider: Primary Doctor No      Subjective:     Principal Problem:Decompensated hepatic cirrhosis      HPI:  57 yo M with PMHx alcoholic cirrhosis who presents to the ED complaining of abdominal distenstion and discomfort x a few days. Pt. Reports that he has been having worsening abdominal distension since his last paracentesis one month ago that is now causing significant discomfort, difficulty breathing and poor appetite. Pt. Reports that he had a TIPS procedure in the past to prvent the swelling, but the TIPS was not completely successful and he still requires frequent paracentesis. Pt. Denies any current fevers, chills, confusion, nausea, or vomiting. [place] and he is also interested in a liver transplant evaluation at this facility if possible. He denies history of hepatitis. He was told his cirrhosis was a result of alcohol abuse and has not had any alcohol since January of this year.      Interval History: MICHAEL Wants his oxycodone continued-- has been on it for 5 years. Has had several BMs this afternoon.      Review of Systems   Respiratory:  Negative for shortness of breath.    Cardiovascular:  Negative for chest pain.   Gastrointestinal:  Negative for abdominal pain.   Genitourinary:  Negative for dysuria.   Neurological:  Negative for headaches.     Objective:     Vital Signs (Most Recent):  Temp: 98.1 °F (36.7 °C) (07/22/22 1130)  Pulse: 70 (07/22/22 1130)  Resp: 18 (07/22/22 1130)  BP: 128/74 (07/22/22 1130)  SpO2: (!) 94 % (07/22/22 1130)   Vital Signs (24h Range):  Temp:  [97.5 °F (36.4 °C)-98.3 °F (36.8 °C)] 98.1 °F (36.7 °C)  Pulse:  [65-75] 70  Resp:  [17-20] 18  SpO2:  [93 %-95 %] 94 %  BP:  (122-131)/(60-84) 128/74     Weight: 78 kg (171 lb 15.3 oz)  Body mass index is 23.32 kg/m².    Intake/Output Summary (Last 24 hours) at 7/22/2022 1723  Last data filed at 7/21/2022 1800  Gross per 24 hour   Intake 240 ml   Output --   Net 240 ml        Physical Exam  Vitals and nursing note reviewed.   Constitutional:       General: He is not in acute distress.     Appearance: He is well-developed.   HENT:      Head: Normocephalic and atraumatic.   Eyes:      Conjunctiva/sclera: Conjunctivae normal.   Neck:      Vascular: No JVD.   Cardiovascular:      Rate and Rhythm: Normal rate and regular rhythm.      Heart sounds: Normal heart sounds.   Pulmonary:      Effort: Pulmonary effort is normal.      Breath sounds: Normal breath sounds.   Abdominal:      General: Abdomen is protuberant. Bowel sounds are normal. There is distension.      Palpations: Abdomen is soft.   Musculoskeletal:      Cervical back: Neck supple.      Right lower leg: No edema.      Left lower leg: No edema.   Neurological:      Mental Status: He is alert.   Psychiatric:         Behavior: Behavior normal.       Significant Labs: All pertinent labs within the past 24 hours have been reviewed.  CBC:   No results for input(s): WBC, HGB, HCT, PLT in the last 48 hours.    CMP:   Recent Labs   Lab 07/22/22  1100   *   K 3.4*   CL 97   CO2 27      BUN 11   CREATININE 0.8   CALCIUM 8.4*   PROT 7.2   ALBUMIN 2.3*   BILITOT 1.6*   ALKPHOS 335*   AST 89*   ALT 29   ANIONGAP 9   EGFRNONAA >60.0       Coagulation:   Recent Labs   Lab 07/22/22  1100   INR 1.4*         Significant Imaging: I have reviewed all pertinent imaging results/findings within the past 24 hours.      Assessment/Plan:      * Decompensated hepatic cirrhosis  -Worsening ascites in setting of decompensated cirrhosis. Per hepatology, now requires paracentesis every 2 months. Discussed with fellow-- continue lactulose and if no BM give rectally. Continue diuretics (lasix 80 mg and  spironolactone 200 mg). Add Rifaximin for HE. Patient to establish with hepatology clinic to start transplant eval process as outpatient.      -s/p successful paracentesis. Removed 4200 mL  - potassium supplementation prn    Chronic pain  - reviewed on -- gets oxy 10 mg 2 times a day. Will continue as such.    VTE Risk Mitigation (From admission, onward)         Ordered     IP VTE LOW RISK PATIENT  Once         07/19/22 2340     Place sequential compression device  Until discontinued         07/19/22 2340                Discharge Planning   AHSAN: 7/25/2022     Code Status: Full Code   Is the patient medically ready for discharge?: No    Reason for patient still in hospital (select all that apply): Patient trending condition, Treatment and Consult recommendations  Discharge Plan A: Home Health           Belkys Cordova MD  Department of Hospital Medicine   [facility] - Transplant Stepdown

## 2022-07-22 NOTE — PROGRESS NOTES
[facility]  Hepatology  Progress Note    Patient Name: Robert Davila  MRN: [id number]  Admission Date: 7/19/2022      Subjective:     Interval History:  Reports no BM today. Mental status somewhat improved compared to yesterday. Minimal asterixis. Takes oxycodone for chronic testicular pain.    No current facility-administered medications on file prior to encounter.     Current Outpatient Medications on File Prior to Encounter   Medication Sig Dispense Refill    amitriptyline (ELAVIL) 50 MG tablet amitriptyline 50 mg tablet   TAKE 1 TABLET BY MOUTH AT BEDTIME FOR NERVE PAIN      cyclobenzaprine (FLEXERIL) 5 MG tablet 10 mg 2 (two) times daily as needed.      lactulose (CHRONULAC) 10 gram/15 mL solution lactulose 20 gram/30 mL oral solution   45 ml po three times daily      levothyroxine (SYNTHROID) 200 MCG tablet levothyroxine 200 mcg tablet   Take 1 tablet every day by oral route.      oxyCODONE (ROXICODONE) 10 mg Tab immediate release tablet Take 10 mg by mouth.      pantoprazole (PROTONIX) 40 MG tablet pantoprazole 40 mg tablet,delayed release   Take 1 tablet every day by oral route.      propranoloL (INDERAL) 10 MG tablet propranolol 10 mg tablet   Take 1 tablet 3 times a day by oral route for 90 days.      spironolactone (ALDACTONE) 100 MG tablet spironolactone 100 mg tablet      tamsulosin (FLOMAX) 0.4 mg Cap tamsulosin 0.4 mg capsule   TAKE 1 CAPSULE BY MOUTH AT BEDTIME      thiamine 100 MG tablet thiamine HCl (vitamin B1) 100 mg tablet   TAKE ONE TABLET BY MOUTH EVERY DAY         Review of patient's allergies indicates:   Allergen Reactions    Biaxin [clarithromycin] Anaphylaxis    Clindamycin Swelling    Penicillins Swelling         Objective:     Vitals:    07/22/22 1130   BP: 128/74   Pulse: 70   Resp: 18   Temp: 98.1 °F (36.7 °C)         Constitutional:  not in acute distress and well developed  HENT: Head: Normal, normocephalic, atraumatic.  Eyes: conjunctiva clear and  sclera nonicteric  Cardiovascular: regular rate and rhythm and no murmur  Respiratory: normal chest expansion & respiratory effort   and no accessory muscle use  GI: soft, distended and nontender  Musculoskeletal: no muscular tenderness noted  Skin: normal color  Neurological: asterixis present  Psychiatric: mood and affect are within normal limits, pt is a good historian; no memory problems were noted    Significant Labs:  Recent Labs   Lab 07/19/22  1927 07/20/22  0612   HGB 11.0* 9.7*       Lab Results   Component Value Date    WBC 4.97 07/20/2022    HGB 9.7 (L) 07/20/2022    HCT 30.3 (L) 07/20/2022    MCV 86 07/20/2022     07/20/2022       Lab Results   Component Value Date     (L) 07/22/2022    K 3.4 (L) 07/22/2022    CL 97 07/22/2022    CO2 27 07/22/2022    BUN 11 07/22/2022    CREATININE 0.8 07/22/2022    CALCIUM 8.4 (L) 07/22/2022    ANIONGAP 9 07/22/2022    ESTGFRAFRICA >60.0 07/22/2022    EGFRNONAA >60.0 07/22/2022       Lab Results   Component Value Date    ALT 29 07/22/2022    AST 89 (H) 07/22/2022     (H) 07/21/2022    ALKPHOS 335 (H) 07/22/2022    BILITOT 1.6 (H) 07/22/2022       Lab Results   Component Value Date    INR 1.4 (H) 07/22/2022    INR 1.4 (H) 07/20/2022    INR 1.3 (H) 07/19/2022       Significant Imaging:  Reviewed pertinent radiology findings.     - Iron panel, ceruloplasmin, viral hepatitis panel wnl    Assessment/Plan:     Robert Davila is a 56 y.o. male with history of decompensated EtOH cirrhosis with refractory ascites requiring TIPS and multiple revisions, now with occluded TIPS. Here for refractory ascites. S/p LVP para today. IR obtaining triple phase CT to further evaluate occluded TIPS and any possible chance for revision although seems unlikely with several reported attempts in the past. Would aggressive lactulose and rifaximin and increase in diuretics. Otherwise seems motivated to establish with hepatology clinic and pursue outpatient transplant  evaluation.    Problem List:  1. Decompensated alcoholic cirrhosis  2. Occluded TIPS, refractory ascites  3. Hepatic encephalpathy        Recommendations:  - Lab workup pending for any concomittant cause of cirrhosis. Negative thus far. BRIELLE, ASMA, AMA, pending  - Continue lactulose, would trial enemas until BM is obtained  - Would stop opiates if at all possible and discuss multimodal pain control. May be difficult if longstanding history of opiate use and may require tapering concerned for opiates exacerbating HE  - Continue rifaximin 550 mg BID  - Diuretics: lasix 80 mg and spironolactone 200 mg daily  - Repeat paracentesis today, 4.2L removed  - Concerned his vasculature seen on CT A/P may limit transplant surgery options if indicated. Will need to discuss with committee.      Thank you for involving us in the care of Robert Davila. Please call with any additional questions, concerns or changes in the patient's clinical status. We will continue to follow.    Donnie Aguilar MD  Gastroenterology Fellow PGY IV   [facility]

## 2022-07-22 NOTE — PLAN OF CARE
AAOx4, VSS  Stomache pain continued; PRN oxy 5 given when requested, flexeril given PRN as well  Plan to follow up with hepatology in terms of liver eval  Cont PO lactulose + rifaximin, last para 7/20  Non skid socks worn, call light within reach, will cont to monitor

## 2022-07-23 VITALS
HEART RATE: 62 BPM | TEMPERATURE: 98 F | OXYGEN SATURATION: 94 % | BODY MASS INDEX: 23.29 KG/M2 | SYSTOLIC BLOOD PRESSURE: 120 MMHG | HEIGHT: 72 IN | DIASTOLIC BLOOD PRESSURE: 61 MMHG | RESPIRATION RATE: 18 BRPM | WEIGHT: 171.94 LBS

## 2022-07-23 LAB
ALBUMIN SERPL BCP-MCNC: 2.3 G/DL (ref 3.5–5.2)
ALP SERPL-CCNC: 372 U/L (ref 55–135)
ALT SERPL W/O P-5'-P-CCNC: 36 U/L (ref 10–44)
ANION GAP SERPL CALC-SCNC: 9 MMOL/L (ref 8–16)
AST SERPL-CCNC: 124 U/L (ref 10–40)
BACTERIA SPEC AEROBE CULT: NO GROWTH
BASOPHILS # BLD AUTO: 0.07 K/UL (ref 0–0.2)
BASOPHILS NFR BLD: 1.2 % (ref 0–1.9)
BILIRUB SERPL-MCNC: 1.1 MG/DL (ref 0.1–1)
BUN SERPL-MCNC: 11 MG/DL (ref 6–20)
CALCIUM SERPL-MCNC: 8.1 MG/DL (ref 8.7–10.5)
CHLORIDE SERPL-SCNC: 96 MMOL/L (ref 95–110)
CO2 SERPL-SCNC: 28 MMOL/L (ref 23–29)
CREAT SERPL-MCNC: 0.8 MG/DL (ref 0.5–1.4)
DIFFERENTIAL METHOD: ABNORMAL
EOSINOPHIL # BLD AUTO: 0.2 K/UL (ref 0–0.5)
EOSINOPHIL NFR BLD: 4 % (ref 0–8)
ERYTHROCYTE [DISTWIDTH] IN BLOOD BY AUTOMATED COUNT: 22.5 % (ref 11.5–14.5)
EST. GFR  (AFRICAN AMERICAN): >60 ML/MIN/1.73 M^2
EST. GFR  (NON AFRICAN AMERICAN): >60 ML/MIN/1.73 M^2
GLUCOSE SERPL-MCNC: 96 MG/DL (ref 70–110)
HCT VFR BLD AUTO: 29.3 % (ref 40–54)
HGB BLD-MCNC: 9.1 G/DL (ref 14–18)
IMM GRANULOCYTES # BLD AUTO: 0.02 K/UL (ref 0–0.04)
IMM GRANULOCYTES NFR BLD AUTO: 0.3 % (ref 0–0.5)
INR PPP: 1.5 (ref 0.8–1.2)
LYMPHOCYTES # BLD AUTO: 0.4 K/UL (ref 1–4.8)
LYMPHOCYTES NFR BLD: 7 % (ref 18–48)
MCH RBC QN AUTO: 27.7 PG (ref 27–31)
MCHC RBC AUTO-ENTMCNC: 31.1 G/DL (ref 32–36)
MCV RBC AUTO: 89 FL (ref 82–98)
MONOCYTES # BLD AUTO: 1.1 K/UL (ref 0.3–1)
MONOCYTES NFR BLD: 18.1 % (ref 4–15)
NEUTROPHILS # BLD AUTO: 4.2 K/UL (ref 1.8–7.7)
NEUTROPHILS NFR BLD: 69.4 % (ref 38–73)
NRBC BLD-RTO: 0 /100 WBC
PETH 16:0/18.1 (POPETH): 740 NG/ML
PETH 16:0/18.2 (PLPETH): 559 NG/ML
PLATELET # BLD AUTO: 196 K/UL (ref 150–450)
PMV BLD AUTO: 11 FL (ref 9.2–12.9)
POTASSIUM SERPL-SCNC: 3 MMOL/L (ref 3.5–5.1)
PROT SERPL-MCNC: 7.2 G/DL (ref 6–8.4)
PROTHROMBIN TIME: 15.1 SEC (ref 9–12.5)
RBC # BLD AUTO: 3.28 M/UL (ref 4.6–6.2)
SODIUM SERPL-SCNC: 133 MMOL/L (ref 136–145)
WBC # BLD AUTO: 6.01 K/UL (ref 3.9–12.7)

## 2022-07-23 PROCEDURE — 80053 COMPREHEN METABOLIC PANEL: CPT | Performed by: FAMILY MEDICINE

## 2022-07-23 PROCEDURE — 99239 HOSP IP/OBS DSCHRG MGMT >30: CPT | Mod: ,,, | Performed by: FAMILY MEDICINE

## 2022-07-23 PROCEDURE — 85610 PROTHROMBIN TIME: CPT | Performed by: FAMILY MEDICINE

## 2022-07-23 PROCEDURE — 99239 PR HOSPITAL DISCHARGE DAY,>30 MIN: ICD-10-PCS | Mod: ,,, | Performed by: FAMILY MEDICINE

## 2022-07-23 PROCEDURE — 85025 COMPLETE CBC W/AUTO DIFF WBC: CPT | Performed by: FAMILY MEDICINE

## 2022-07-23 PROCEDURE — 25000003 PHARM REV CODE 250: Performed by: FAMILY MEDICINE

## 2022-07-23 PROCEDURE — 25000003 PHARM REV CODE 250: Performed by: HOSPITALIST

## 2022-07-23 PROCEDURE — 36415 COLL VENOUS BLD VENIPUNCTURE: CPT | Performed by: FAMILY MEDICINE

## 2022-07-23 PROCEDURE — S4991 NICOTINE PATCH NONLEGEND: HCPCS | Performed by: FAMILY MEDICINE

## 2022-07-23 RX ORDER — FUROSEMIDE 80 MG/1
80 TABLET ORAL DAILY
Qty: 30 TABLET | Refills: 0 | Status: SHIPPED | OUTPATIENT
Start: 2022-07-24 | End: 2023-07-24

## 2022-07-23 RX ORDER — LACTULOSE 10 G/15ML
30 SOLUTION ORAL 3 TIMES DAILY
COMMUNITY
Start: 2022-07-23

## 2022-07-23 RX ORDER — SPIRONOLACTONE 100 MG/1
200 TABLET, FILM COATED ORAL DAILY
Qty: 60 TABLET | Refills: 0 | Status: SHIPPED | OUTPATIENT
Start: 2022-07-24 | End: 2023-07-24

## 2022-07-23 RX ORDER — LEVOTHYROXINE SODIUM 200 UG/1
200 TABLET ORAL
Qty: 30 TABLET | Refills: 0 | Status: SHIPPED | OUTPATIENT
Start: 2022-07-24 | End: 2023-07-24

## 2022-07-23 RX ADMIN — SPIRONOLACTONE 200 MG: 50 TABLET, FILM COATED ORAL at 08:07

## 2022-07-23 RX ADMIN — TAMSULOSIN HYDROCHLORIDE 0.4 MG: 0.4 CAPSULE ORAL at 08:07

## 2022-07-23 RX ADMIN — NICOTINE 1 PATCH: 14 PATCH, EXTENDED RELEASE TRANSDERMAL at 08:07

## 2022-07-23 RX ADMIN — LACTULOSE 30 G: 20 SOLUTION ORAL at 08:07

## 2022-07-23 RX ADMIN — RIFAXIMIN 550 MG: 550 TABLET ORAL at 08:07

## 2022-07-23 RX ADMIN — OXYCODONE HYDROCHLORIDE 10 MG: 10 TABLET ORAL at 08:07

## 2022-07-23 RX ADMIN — PANTOPRAZOLE SODIUM 40 MG: 40 TABLET, DELAYED RELEASE ORAL at 08:07

## 2022-07-23 RX ADMIN — THERA TABS 1 TABLET: TAB at 08:07

## 2022-07-23 RX ADMIN — FUROSEMIDE 80 MG: 40 TABLET ORAL at 08:07

## 2022-07-23 RX ADMIN — PROPRANOLOL HYDROCHLORIDE 10 MG: 10 TABLET ORAL at 08:07

## 2022-07-23 RX ADMIN — LEVOTHYROXINE SODIUM 200 MCG: 100 TABLET ORAL at 05:07

## 2022-07-23 NOTE — HOSPITAL COURSE
He was diuresed with PO Lasix and spironolactone. IR performed paracentesis. There were no complications. He was given lactulose and rifaximin. Hepatology evaluated him. He was cleared for dc after starting to have some BMs.

## 2022-07-23 NOTE — PLAN OF CARE
AAOx4, VSS  Plan to follow up with hepatology in terms of liver eval  Cont PO lactulose + rifaximin, last para 7/20. Stomach pain controlled with oxy 5q12h, 2 BM yesterday 7/23  Acid reflux controlled with PRN Maalox   Non skid socks worn, call light within reach, will cont to monitor

## 2022-07-23 NOTE — DISCHARGE SUMMARY
Alexasnder Townsend - Transplant [facility] Medicine  Discharge Summary      Patient Name: Robert Davila  MRN: [id number]  Patient Class: IP- Inpatient  Admission Date: 7/19/2022  Hospital Length of Stay: 1 days  Discharge Date and Time: 7/23/2022  4:37 PM  Discharging Provider: Belkys Cordova MD  Primary Care Provider: Primary [facility] Medicine Team: [facility] MED B Belkys Cordova MD      HPI:   55 yo M with PMHx alcoholic cirrhosis who presents to the ED complaining of abdominal distenstion and discomfort x a few days. Pt. Reports that he has been having worsening abdominal distension since his last paracentesis one month ago that is now causing significant discomfort, difficulty breathing and poor appetite. Pt. Reports that he had a TIPS procedure in the past to prvent the swelling, but the TIPS was not completely successful and he still requires frequent paracentesis. Pt. Denies any current fevers, chills, confusion, nausea, or vomiting. [place] and he is also interested in a liver transplant evaluation at this facility if possible. He denies history of hepatitis. He was told his cirrhosis was a result of alcohol abuse and has not had any alcohol since January of this year.      Hospital Course:   He was diuresed with PO Lasix and spironolactone. IR performed paracentesis. There were no complications. He was given lactulose and rifaximin. Hepatology evaluated him. He was cleared for dc after starting to have some BMs.        Consults:   Consults (From admission, onward)        Status Ordering Provider     Inpatient consult to Interventional Radiology  Once        Provider:  (Not yet assigned)    Completed RANDEE CAMPUZANO     Inpatient consult to Hepatology  Once        Provider:  (Not yet assigned)    Completed CHELLE TESFAYE          * Decompensated hepatic cirrhosis  - Continue diuretics (lasix 80 mg and spironolactone 200 mg), Lactulose and  Rifaximin. Patient to establish with hepatology clinic to start transplant eval process as outpatient.   -s/p successful paracentesis. Removed 4200 mL    Chronic pain  - continue oxy       Final Active Diagnoses:    Diagnosis Date Noted POA    PRINCIPAL PROBLEM:  Decompensated hepatic cirrhosis [K72.90, K74.60] 07/19/2022 Yes    Hypokalemia [E87.6] 07/20/2022 Yes    Elevated bilirubin [R17] 07/20/2022 Yes    History of alcohol abuse [F10.11] 07/20/2022 Yes      Problems Resolved During this Admission:       Discharged Condition: stable    Disposition: Home or Self Care    Follow Up: PCP in [place] in 1-3 days    Patient Instructions:      Ambulatory referral/consult to Hepatology   Standing Status: Future   Referral Priority: Routine Referral Type: Consultation   Referral Reason: Specialty Services Required   Requested Specialty: Hepatology   Number of Visits Requested: 1     Diet Adult Regular     Activity as tolerated       Significant Diagnostic Studies: Labs:   CMP   Recent Labs   Lab 07/22/22  1100 07/23/22  0716   * 133*   K 3.4* 3.0*   CL 97 96   CO2 27 28    96   BUN 11 11   CREATININE 0.8 0.8   CALCIUM 8.4* 8.1*   PROT 7.2 7.2   ALBUMIN 2.3* 2.3*   BILITOT 1.6* 1.1*   ALKPHOS 335* 372*   AST 89* 124*   ALT 29 36   ANIONGAP 9 9   ESTGFRAFRICA >60.0 >60.0   EGFRNONAA >60.0 >60.0   , CBC   Recent Labs   Lab 07/23/22  0716   WBC 6.01   HGB 9.1*   HCT 29.3*       and INR   Lab Results   Component Value Date    INR 1.5 (H) 07/23/2022    INR 1.4 (H) 07/22/2022    INR 1.4 (H) 07/20/2022       Pending Diagnostic Studies:     Procedure Component Value Units Date/Time    Anti-Smooth Muscle Antibody [[id number]] Collected: 07/21/22 0656    Order Status: Sent Lab Status: In process Updated: 07/21/22 0741    Specimen: Blood     Antimitochondrial Antibody [[id number]] Collected: 07/21/22 0656    Order Status: Sent Lab Status: In process Updated: 07/21/22 0741    Specimen: Blood     IR Paracentesis with  Imaging [[id number]]     Order Status: Sent Lab Status: No result          Medications:  Reconciled Home Medications:      Medication List      START taking these medications    furosemide 80 MG tablet  Commonly known as: LASIX  Take 1 tablet (80 mg total) by mouth once daily.  Start taking on: July 24, 2022     rifAXIMin 550 mg Tab  Commonly known as: XIFAXAN  Take 1 tablet (550 mg total) by mouth 2 (two) times daily.        CHANGE how you take these medications    lactulose 20 gram/30 mL Soln  Commonly known as: CHRONULAC  Take 45 mLs (30 g total) by mouth 3 (three) times daily.  What changed:   · medication strength  · See the new instructions.     * levothyroxine 200 MCG tablet  Commonly known as: SYNTHROID  Take 1 tablet (200 mcg total) by mouth before breakfast.  Start taking on: July 24, 2022  What changed: You were already taking a medication with the same name, and this prescription was added. Make sure you understand how and when to take each.     * levothyroxine 200 MCG tablet  Commonly known as: SYNTHROID  levothyroxine 200 mcg tablet   Take 1 tablet every day by oral route.  What changed: Another medication with the same name was added. Make sure you understand how and when to take each.     spironolactone 100 MG tablet  Commonly known as: ALDACTONE  Take 2 tablets (200 mg total) by mouth once daily.  Start taking on: July 24, 2022  What changed: See the new instructions.         * This list has 2 medication(s) that are the same as other medications prescribed for you. Read the directions carefully, and ask your doctor or other care provider to review them with you.            CONTINUE taking these medications    amitriptyline 50 MG tablet  Commonly known as: ELAVIL  amitriptyline 50 mg tablet   TAKE 1 TABLET BY MOUTH AT BEDTIME FOR NERVE PAIN     cyclobenzaprine 5 MG tablet  Commonly known as: FLEXERIL  10 mg 2 (two) times daily as needed.     oxyCODONE 10 mg Tab immediate release tablet  Commonly known  as: ROXICODONE  Take 10 mg by mouth.     pantoprazole 40 MG tablet  Commonly known as: PROTONIX  pantoprazole 40 mg tablet,delayed release   Take 1 tablet every day by oral route.     propranoloL 10 MG tablet  Commonly known as: INDERAL  propranolol 10 mg tablet   Take 1 tablet 3 times a day by oral route for 90 days.     tamsulosin 0.4 mg Cap  Commonly known as: FLOMAX  tamsulosin 0.4 mg capsule   TAKE 1 CAPSULE BY MOUTH AT BEDTIME     thiamine 100 MG tablet  thiamine HCl (vitamin B1) 100 mg tablet   TAKE ONE TABLET BY MOUTH EVERY DAY            Indwelling Lines/Drains at time of discharge:   Lines/Drains/Airways     None                 Time spent on the discharge of patient: 33 minutes  Patient examined at bedside on day of discharge. Exam findings stable. He was deemed safe for discharge.       Belkys Cordova MD  Department of Hospital Medicine  [facility] - Transplant Stepdown

## 2022-07-25 LAB — MITOCHONDRIA AB TITR SER IF: NORMAL {TITER}

## 2022-07-26 ENCOUNTER — TELEPHONE (OUTPATIENT)
Dept: HEPATOLOGY | Facility: CLINIC | Age: 57
End: 2022-07-26
Payer: MEDICARE

## 2022-07-26 ENCOUNTER — CONFERENCE (OUTPATIENT)
Dept: TRANSPLANT | Facility: CLINIC | Age: 57
End: 2022-07-26
Payer: MEDICARE

## 2022-07-26 NOTE — TELEPHONE ENCOUNTER
Call to pt to discuss radiology review of his films. Message left. Will explain review when I see him in f/u.

## 2022-07-26 NOTE — PHYSICIAN QUERY
PT Name: Robert Davila  MR #: [id number]     DOCUMENTATION CLARIFICATION      CDS Bouchra Heredia, RN, BSN        Contact Information:    [email or web address]         This form is a permanent document in the medical record.     Query Date: July 26, 2022    By submitting this query, we are merely seeking further clarification of documentation to reflect the severity of illness of your patient. Please utilize your independent clinical judgment when addressing the question(s) below.    The Medical Record contains the following:     Indicators   Supporting Clinical Findings Location in Medical Record   X   Documentation/History of Hepatic Encephalopathy Principal Problem: Decompensated hepatic cirrhosis  55 yo M with PMHx alcoholic cirrhosis who presents to the ED complaining of abdominal distenstion and discomfort     Pt clearly encephalopathic today with asterixis  Hepatic encephalpathy    7/20 H&P        7/21 Hepatology PN   X   Neurological Symptoms Neurological:      Mental Status: He is alert and oriented to person, place, and time    Asterixis noted on exam today        - Seems encephalopathic during our visit, had difficulty recalling and dialing phone number when giving contact info 7/20 H&P        7/20 Hepatology consult     7/21 Hepatology PN   X   Lab Value(s)  07/20/22 06:12   Ammonia 103 (H)      Labs   X   Treatment/Medication lactulose 20 gram/30 mL solution Soln 20 g     Ordered Dose: 20 g Route: Oral Frequency: 3 times daily  Scheduled Start Date/Time: 07/20/22 0900 End Date/Time: 07/21/22 1625     lactulose 20 gram/30 mL solution Soln 30 g     Ordered Dose: 30 g Route: Oral Frequency: 3 times daily  Start Date/Time (Original Order): 07/20/22 0900 End Date/Time: 07/23/22 1908   Start Date/Time (After Last Modification): 07/21/22 2100     rifAXIMin tablet 550 mg    Ordered Dose: 550 mg Route: Oral Frequency: 2 times daily  Scheduled Start Date/Time: 07/21/22 2100 End Date/Time: 07/23/22 1908      MAR   X    Other -Continue home meds for diuresis and HE prevention    He has been taking diuretics at home. He took up to 5 lactulose at home and still wasn't having BMS so he got behind on BMS which is likely cause for higher ammonia. He has never had a script for lactulose enemas to use if this happens and likely needs on dc.    - Continue lactulose, would increase frequency to at least q6h given HE noted clinically today  - For worsening HE, can dose q2h for 2-3 doses until clinical improvement  - Start rifaximin 550 mg BID  - Increase diuretics to lasix 80 mg and spironolactone 200 mg daily 7/20 H&P    7/20  Plan of Care note         7/21 Hepatology PN       Provider, please further specify the  acuity  for  diagnosis of Hepatic Encephalopathy.    [   ] Acute     [  X ] Chronic     [   ] Subacute     [   ] Acute on chronic     [   ] Other clarification (please specify): _____________     [   ] Clinically undetermined     Present on admission (POA) status:    X] Yes (Y)   [   ] No (N)   [   ] Documentation insufficient to determine if condition is POA (U)   [   ] Clinically Undetermined (W)       Please document in your progress notes daily for the duration of treatment until resolved, and include in your discharge summary.    Form No. 32889

## 2022-07-27 LAB
BACTERIA SPEC ANAEROBE CULT: NORMAL
SMOOTH MUSCLE AB TITR SER IF: ABNORMAL {TITER}

## 2022-07-27 NOTE — TELEPHONE ENCOUNTER
Patient: Robert Davila       MRN: [id number]      : 1965     Age: 56 y.o.  [address] [facility] [address]     Providers  Hepatologists: Bobbi Luis MD        Priority of review: Other/IS TIPS an Option?     Patient Transplant Status: Other Outpatient Evaluation requested     Reason for presentation: Reassessment     Clinical Summary:  56 y.o. male with history of decompensated EtOH cirrhosis with refractory ascites requiring TIPS and multiple revisions, now with occluded TIPS. PVT with cavernous transformation. Is TIPS Revision possible?     Imaging to be reviewed: CT abdomen     HCC Treatment History: N/A     ABO: N/A     Platelets:         Lab Results   Component Value Date/Time      2022 06:12 AM      Creatinine:         Lab Results   Component Value Date/Time     CREATININE 0.8 2022 11:00 AM      Bilirubin:         Lab Results   Component Value Date/Time     BILITOT 1.6 (H) 2022 11:00 AM      AFP Last 3 each if available: No results found for: AFP, EXTAFP     MELD: MELD-Na score: 16 at 2022 11:00 AM  MELD score: 12 at 2022 11:00 AM  Calculated from:  Serum Creatinine: 0.8 mg/dL (Using min of 1 mg/dL) at 2022 11:00 AM  Serum Sodium: 133 mmol/L at 2022 11:00 AM  Total Bilirubin: 1.6 mg/dL at 2022 11:00 AM  INR(ratio): 1.4 at 2022 11:00 AM  Age: 56 years     Plan: TIPS, Portal, mesenteric, 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 TIPS appears to be in place 2/2 ascites. New TIPS would have to be placed, which would require extensive reconstruction and would likely occlude secondary to poor inflow from chronically thrombosed veins.      The TIPS that has been previously revised before, it looks like it is out.  Looks like it came out at the side of the original   TIPS going into the portal vein again   Portal vein out thrombus-- small amount of contrast that can be seen for a short period  -can't see SMV, IMV and  splenic vein  can't see at all.   Looks like a bunch of collateralization.  Concerned about any inflow to the portal vein for either TIPS revision of transplant.  TIPS occluded -  can't do a revision no inflow can't open it up, will not stay open a long per of time. Chronically thrombus   Unable to provide what the portal vein needs.     Follow-up Provider: Dr Luis

## 2022-07-30 ENCOUNTER — PATIENT OUTREACH (OUTPATIENT)
Dept: EMERGENCY MEDICINE | Facility: HOSPITAL | Age: 57
End: 2022-07-30

## 2022-08-03 NOTE — PROGRESS NOTES
Transplant evaluation recommended by Dr. Luis.  Called pt to inform him that financial clearance has been obtained to proceed w/ outpatient [facility] liver transplant evaluation and to discuss eval in detail.  No answer at this time. Message left on VM. Return phone call requested to discuss evaluation and his availability for appts. Contact info provided. Awaiting callback.

## 2022-08-12 DIAGNOSIS — F10.11 HISTORY OF ALCOHOL ABUSE: ICD-10-CM

## 2022-08-12 DIAGNOSIS — K74.60 DECOMPENSATED HEPATIC CIRRHOSIS: Primary | ICD-10-CM

## 2022-08-12 DIAGNOSIS — Z12.5 ENCOUNTER FOR SCREENING FOR MALIGNANT NEOPLASM OF PROSTATE: ICD-10-CM

## 2022-08-12 DIAGNOSIS — Z76.82 ORGAN TRANSPLANT CANDIDATE: ICD-10-CM

## 2022-08-12 DIAGNOSIS — R17 ELEVATED BILIRUBIN: ICD-10-CM

## 2022-08-12 DIAGNOSIS — R93.89 ABNORMAL FINDINGS ON DIAGNOSTIC IMAGING OF OTHER SPECIFIED BODY STRUCTURES: ICD-10-CM

## 2022-08-12 DIAGNOSIS — K72.90 DECOMPENSATED HEPATIC CIRRHOSIS: Primary | ICD-10-CM

## 2022-08-12 NOTE — PROGRESS NOTES
Called pt again to inform him that financial clearance has been obtained from the insurance company to initiate liver transplant evaluation.  Informed patient that evaluation will take approx 2 days to complete. Informed him that all testing will be done outpatient.  Patient voiced understanding of the need to have his caregiver present for the  and surgeon consult, as well as for the patient education, and reports that his brother will likely accompany him.  All questions/ concerns regarding liver transplant evaluation answered/ addressed.    Will enter orders for liver transplant evaluation and submit to  for scheduling.  Since patient is scheduled for f/u appt in transplant clinic on 8/24, will schedule appts for eval on 8/25 (and 8/24, prior to clinic appt).  Patient voiced understanding and is in agreement with that plan.

## 2022-08-22 ENCOUNTER — TELEPHONE (OUTPATIENT)
Dept: CARDIOLOGY | Facility: HOSPITAL | Age: 57
End: 2022-08-22
Payer: MEDICARE

## 2022-08-23 ENCOUNTER — TELEPHONE (OUTPATIENT)
Dept: TRANSPLANT | Facility: CLINIC | Age: 57
End: 2022-08-23
Payer: MEDICARE

## 2022-08-23 NOTE — TELEPHONE ENCOUNTER
Called patient x2 to discuss the liver transplant evaluation scheduled to begin tomorrow and recent positive PEth test.  No answer at this time.  Left message on  requesting a call back to discuss above.  Contact info provided.  Awaiting call back.

## 2022-08-24 ENCOUNTER — TELEPHONE (OUTPATIENT)
Dept: TRANSPLANT | Facility: CLINIC | Age: 57
End: 2022-08-24
Payer: MEDICARE

## 2022-08-26 ENCOUNTER — TELEPHONE (OUTPATIENT)
Dept: TRANSPLANT | Facility: CLINIC | Age: 57
End: 2022-08-26
Payer: MEDICARE

## 2022-08-26 DIAGNOSIS — F10.11 HISTORY OF ALCOHOL ABUSE: Primary | ICD-10-CM

## 2022-08-26 NOTE — TELEPHONE ENCOUNTER
Transplant evaluation and f/u appt in transplant clinic cancelled.  Called patient to f/u.  Patient reports that he was in route on Wednesday for appts but weather was too bad and was also running late, so turned around and returned home.  Informed patient that call placed to discuss eval prior to appts on Wednesday.  Recommendation made to defer evaluation for now, given PEth test results.  Patient voiced disappointment in results and self and apologized multiple times.  Asked patient when last alcohol consumed, he reports that he is unsure but states that it was prior to hospital stay.  Discussed rather he has engaged in any AA/ rehab.  Patient reports that he previously participated in AA but was not very helpful.  He expresses that he does indeed need professional help and is open to rehab.  Informed patient that appts w/ hepatologist, SW, and psych will be scheduled for first available.  Will also reassess PEth and MELD at that time.  He voiced understanding.  Will notify SWs and patient's primary hepatologist of above.    ===============================================================    ----- Message from Lee Pool sent at 8/24/2022  2:28 PM CDT -----  Regarding: FW: reschedule appts  Patient calling to reschedule appts for today and tomorrow due to inclement weather.  Requesting a call back.     Call:  [phone] (frintit     ----- Message -----  From: Pepe Mahmood  Sent: 8/24/2022   2:27 PM CDT  To: [facility] Pre-Liver Transplant Non-Clinical  Subject: reschedule appts                                 Patient calling to reschedule appts for today and tomorrow due to inclement weather.  Requesting a call back.    Call:  [phone] (frintit

## 2022-10-30 PROBLEM — Z01.818 ENCOUNTER FOR PRE-TRANSPLANT EVALUATION FOR LIVER TRANSPLANT: Status: ACTIVE | Noted: 2022-10-30

## 2022-11-02 ENCOUNTER — OFFICE VISIT (OUTPATIENT)
Dept: TRANSPLANT | Facility: CLINIC | Age: 57
End: 2022-11-02
Payer: MEDICARE

## 2022-11-02 ENCOUNTER — DOCUMENTATION ONLY (OUTPATIENT)
Dept: PSYCHIATRY | Facility: CLINIC | Age: 57
End: 2022-11-02
Payer: MEDICARE

## 2022-11-02 ENCOUNTER — LAB VISIT (OUTPATIENT)
Dept: LAB | Facility: HOSPITAL | Age: 57
End: 2022-11-02
Attending: INTERNAL MEDICINE
Payer: MEDICARE

## 2022-11-02 VITALS
SYSTOLIC BLOOD PRESSURE: 172 MMHG | WEIGHT: 164.13 LBS | OXYGEN SATURATION: 97 % | TEMPERATURE: 99 F | RESPIRATION RATE: 19 BRPM | BODY MASS INDEX: 22.23 KG/M2 | HEART RATE: 86 BPM | HEIGHT: 72 IN | DIASTOLIC BLOOD PRESSURE: 76 MMHG

## 2022-11-02 DIAGNOSIS — F10.11 HISTORY OF ALCOHOL ABUSE: ICD-10-CM

## 2022-11-02 DIAGNOSIS — R17 ELEVATED BILIRUBIN: ICD-10-CM

## 2022-11-02 DIAGNOSIS — K72.90 DECOMPENSATED HEPATIC CIRRHOSIS: ICD-10-CM

## 2022-11-02 DIAGNOSIS — Z76.82 ORGAN TRANSPLANT CANDIDATE: ICD-10-CM

## 2022-11-02 DIAGNOSIS — K74.60 DECOMPENSATED HEPATIC CIRRHOSIS: ICD-10-CM

## 2022-11-02 DIAGNOSIS — K70.31 ALCOHOLIC CIRRHOSIS OF LIVER WITH ASCITES: ICD-10-CM

## 2022-11-02 DIAGNOSIS — Z01.818 ENCOUNTER FOR PRE-TRANSPLANT EVALUATION FOR LIVER TRANSPLANT: Primary | ICD-10-CM

## 2022-11-02 DIAGNOSIS — K76.82 HEPATIC ENCEPHALOPATHY: ICD-10-CM

## 2022-11-02 DIAGNOSIS — I81 PVT (PORTAL VEIN THROMBOSIS): ICD-10-CM

## 2022-11-02 LAB
ALBUMIN SERPL BCP-MCNC: 2.2 G/DL (ref 3.5–5.2)
ALP SERPL-CCNC: 292 U/L (ref 55–135)
ALT SERPL W/O P-5'-P-CCNC: 20 U/L (ref 10–44)
ANION GAP SERPL CALC-SCNC: 10 MMOL/L (ref 8–16)
AST SERPL-CCNC: 64 U/L (ref 10–40)
BASOPHILS # BLD AUTO: 0.05 K/UL (ref 0–0.2)
BASOPHILS NFR BLD: 0.9 % (ref 0–1.9)
BILIRUB SERPL-MCNC: 1.3 MG/DL (ref 0.1–1)
BUN SERPL-MCNC: 10 MG/DL (ref 6–20)
CALCIUM SERPL-MCNC: 7.8 MG/DL (ref 8.7–10.5)
CHLORIDE SERPL-SCNC: 102 MMOL/L (ref 95–110)
CO2 SERPL-SCNC: 20 MMOL/L (ref 23–29)
CREAT SERPL-MCNC: 0.6 MG/DL (ref 0.5–1.4)
DIFFERENTIAL METHOD: ABNORMAL
EOSINOPHIL # BLD AUTO: 0.2 K/UL (ref 0–0.5)
EOSINOPHIL NFR BLD: 3 % (ref 0–8)
ERYTHROCYTE [DISTWIDTH] IN BLOOD BY AUTOMATED COUNT: 20.5 % (ref 11.5–14.5)
EST. GFR  (NO RACE VARIABLE): >60 ML/MIN/1.73 M^2
GLUCOSE SERPL-MCNC: 111 MG/DL (ref 70–110)
HCT VFR BLD AUTO: 29.6 % (ref 40–54)
HGB BLD-MCNC: 9.2 G/DL (ref 14–18)
IMM GRANULOCYTES # BLD AUTO: 0.02 K/UL (ref 0–0.04)
IMM GRANULOCYTES NFR BLD AUTO: 0.4 % (ref 0–0.5)
INR PPP: 1.4 (ref 0.8–1.2)
LYMPHOCYTES # BLD AUTO: 0.5 K/UL (ref 1–4.8)
LYMPHOCYTES NFR BLD: 8.8 % (ref 18–48)
MCH RBC QN AUTO: 26.8 PG (ref 27–31)
MCHC RBC AUTO-ENTMCNC: 31.1 G/DL (ref 32–36)
MCV RBC AUTO: 86 FL (ref 82–98)
MONOCYTES # BLD AUTO: 0.9 K/UL (ref 0.3–1)
MONOCYTES NFR BLD: 17.4 % (ref 4–15)
NEUTROPHILS # BLD AUTO: 3.7 K/UL (ref 1.8–7.7)
NEUTROPHILS NFR BLD: 69.5 % (ref 38–73)
NRBC BLD-RTO: 0 /100 WBC
PLATELET # BLD AUTO: 254 K/UL (ref 150–450)
PMV BLD AUTO: 10.7 FL (ref 9.2–12.9)
POTASSIUM SERPL-SCNC: 3.4 MMOL/L (ref 3.5–5.1)
PROT SERPL-MCNC: 7.1 G/DL (ref 6–8.4)
PROTHROMBIN TIME: 14.2 SEC (ref 9–12.5)
RBC # BLD AUTO: 3.43 M/UL (ref 4.6–6.2)
SODIUM SERPL-SCNC: 132 MMOL/L (ref 136–145)
WBC # BLD AUTO: 5.34 K/UL (ref 3.9–12.7)

## 2022-11-02 PROCEDURE — 85025 COMPLETE CBC W/AUTO DIFF WBC: CPT | Mod: 91,TXP | Performed by: INTERNAL MEDICINE

## 2022-11-02 PROCEDURE — 99999 PR PBB SHADOW E&M-EST. PATIENT-LVL V: CPT | Mod: PBBFAC,TXP,, | Performed by: INTERNAL MEDICINE

## 2022-11-02 PROCEDURE — 36415 COLL VENOUS BLD VENIPUNCTURE: CPT | Mod: PN,TXP | Performed by: INTERNAL MEDICINE

## 2022-11-02 PROCEDURE — 99215 PR OFFICE/OUTPT VISIT, EST, LEVL V, 40-54 MIN: ICD-10-PCS | Mod: S$GLB,TXP,, | Performed by: INTERNAL MEDICINE

## 2022-11-02 PROCEDURE — 99215 OFFICE O/P EST HI 40 MIN: CPT | Mod: S$GLB,TXP,, | Performed by: INTERNAL MEDICINE

## 2022-11-02 PROCEDURE — 80053 COMPREHEN METABOLIC PANEL: CPT | Mod: 91,TXP | Performed by: INTERNAL MEDICINE

## 2022-11-02 PROCEDURE — 85610 PROTHROMBIN TIME: CPT | Mod: 91,TXP | Performed by: INTERNAL MEDICINE

## 2022-11-02 PROCEDURE — 99999 PR PBB SHADOW E&M-EST. PATIENT-LVL V: ICD-10-PCS | Mod: PBBFAC,TXP,, | Performed by: INTERNAL MEDICINE

## 2022-11-02 RX ORDER — FOLIC ACID 1 MG/1
1000 TABLET ORAL DAILY
COMMUNITY
Start: 2022-10-26

## 2022-11-02 RX ORDER — CARVEDILOL 3.12 MG/1
TABLET ORAL
COMMUNITY

## 2022-11-02 RX ORDER — LORAZEPAM 1 MG/1
1 TABLET ORAL EVERY 4 HOURS PRN
COMMUNITY
Start: 2022-08-02

## 2022-11-02 RX ORDER — MELATONIN 10 MG
1 CAPSULE ORAL NIGHTLY
COMMUNITY
Start: 2022-09-29

## 2022-11-02 RX ORDER — ONDANSETRON 4 MG/1
8 TABLET, ORALLY DISINTEGRATING ORAL EVERY 8 HOURS PRN
COMMUNITY
Start: 2022-07-31

## 2022-11-02 RX ORDER — ALBUTEROL SULFATE 90 UG/1
AEROSOL, METERED RESPIRATORY (INHALATION)
COMMUNITY

## 2022-11-02 RX ORDER — FAMOTIDINE 20 MG/1
20 TABLET, FILM COATED ORAL 2 TIMES DAILY
COMMUNITY
Start: 2022-07-31

## 2022-11-02 RX ORDER — POTASSIUM CHLORIDE 20 MEQ/1
20 TABLET, EXTENDED RELEASE ORAL DAILY
COMMUNITY
Start: 2022-05-16

## 2022-11-02 RX ORDER — QUETIAPINE FUMARATE 100 MG/1
TABLET, FILM COATED ORAL
COMMUNITY

## 2022-11-02 RX ORDER — FLUTICASONE PROPIONATE AND SALMETEROL 250; 50 UG/1; UG/1
POWDER RESPIRATORY (INHALATION)
COMMUNITY

## 2022-11-02 RX ORDER — AMLODIPINE BESYLATE 5 MG/1
TABLET ORAL
COMMUNITY

## 2022-11-02 RX ORDER — ALBUTEROL SULFATE 90 UG/1
AEROSOL, METERED RESPIRATORY (INHALATION)
COMMUNITY
Start: 2022-10-27

## 2022-11-02 RX ORDER — TIOTROPIUM BROMIDE 18 UG/1
CAPSULE ORAL; RESPIRATORY (INHALATION)
COMMUNITY

## 2022-11-02 RX ORDER — GABAPENTIN 300 MG/1
CAPSULE ORAL
COMMUNITY

## 2022-11-02 RX ORDER — HALOPERIDOL 1 MG/1
1 TABLET ORAL EVERY 4 HOURS PRN
COMMUNITY
Start: 2022-08-02

## 2022-11-02 RX ORDER — URSODIOL 300 MG/1
CAPSULE ORAL
COMMUNITY
Start: 2022-10-26

## 2022-11-02 NOTE — Clinical Note
Lary, External labs for 3 and 6 months placed and external abdo US for 3 months placed- please mail. Make pt appt to see me in 3- 4 months

## 2022-11-02 NOTE — LETTER
November 3, 2022        Justin Ramsey  [address]  [address]  [facility] [address]  Phone: [phone]  Fax: [phone]             [facility] - Liver Transplant  [address] [address]  Phone: [phone]  Fax: [phone]   Patient: Robert Davila   MR Number: [id number]   YOB: 1965   Date of Visit: 11/2/2022       Dear Dr. Justin Ramsey    Thank you for referring Robert Davila to me for evaluation. Attached you will find relevant portions of my assessment and plan of care.    If you have questions, please do not hesitate to call me. I look forward to following Robert Davila along with you.    Sincerely,    Bobbi Luis MD    Enclosure    If you would like to receive this communication electronically, please contact [email or web address] or [phone] to request [employer] Link access.    [employer] Link is a tool which provides read-only access to select patient information with whom you have a relationship. Its easy to use and provides real time access to review your patients record including encounter summaries, notes, results, and demographic information.    If you feel you have received this communication in error or would no longer like to receive these types of communications, please e-mail [email or web address]

## 2022-11-02 NOTE — PROGRESS NOTES
HEPATOLOGY FOLLOW UP    Referring Physician: Justin Ramsey MD  Current Corresponding Physician: Justin Ramsey MD    Robert Davila is here for follow up of alcohol-induced Cirrhosis      HPI  I saw him in the hospital in consultation 7/20/22:   Robert Davila is a 57 y.o. male with history of decompensated EtOH cirrhosis (refractory ascites s/p TIPS requiring several revisions) who presented for refractory ascites. 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 Reveiwed by IR at [facility]- TIPS revision not possible. Reports ascites has built up over past 2 weeks. Distended and reports SOB due to ascites. Reports routine paracenteses in [place] about every 2 months. Denies fevers, chills. Stated stopped drinking alcohol 01/22 after his sister passed away. Liver with his elderly mother who he takes care of.     Peth was very high in the hospital (740). Reports he attended formal rehab programs in the past and is willing and motivated to quit drinking.    Interval History  Since Robert Davila's last visit he was due to come for an outpt  liver transplant evaluation but could not make it due to weather. He then admitted to continued drinking when confronted with high peth noted during 07/22 admission. He now states he has stopped drinking alcohol but could not give me a date of d/c. He has not attended rehab.     Ascites, ongoing: on lasix 80 mg and spironolactone 200 mg daily  HE, ongoing: lactulose and rifaximin  HCC screening: ct abdo 07/22- no HCC    ALT 22, AST 68, Tbil 1.4, direct 1.0, albumin 2.2, AFP 2.8, Peth pending  MELD-Na score: 11 at 11/2/2022  3:18 PM  MELD score: 11 at 11/2/2022  3:18 PM  Calculated from:  Serum Creatinine: 0.6 mg/dL (Using min of 1 mg/dL) at 11/2/2022  3:18 PM  Serum Sodium: 134 mmol/L at 11/2/2022  3:18 PM  Total Bilirubin:  1.4 mg/dL at 11/2/2022  3:18 PM  INR(ratio): 1.4 at 11/2/2022  3:18 PM  Age: 57 years     Outpatient Encounter Medications as of 11/2/2022   Medication Sig Dispense Refill    amitriptyline (ELAVIL) 50 MG tablet amitriptyline 50 mg tablet   TAKE 1 TABLET BY MOUTH AT BEDTIME FOR NERVE PAIN      cyclobenzaprine (FLEXERIL) 5 MG tablet 10 mg 2 (two) times daily as needed.      furosemide (LASIX) 80 MG tablet Take 1 tablet (80 mg total) by mouth once daily. 30 tablet 0    lactulose (CHRONULAC) 20 gram/30 mL Soln Take 45 mLs (30 g total) by mouth 3 (three) times daily.      levothyroxine (SYNTHROID) 200 MCG tablet levothyroxine 200 mcg tablet   Take 1 tablet every day by oral route.      oxyCODONE (ROXICODONE) 10 mg Tab immediate release tablet Take 10 mg by mouth.      pantoprazole (PROTONIX) 40 MG tablet pantoprazole 40 mg tablet,delayed release   Take 1 tablet every day by oral route.      propranoloL (INDERAL) 10 MG tablet propranolol 10 mg tablet   Take 1 tablet 3 times a day by oral route for 90 days.      rifAXIMin (XIFAXAN) 550 mg Tab Take 1 tablet (550 mg total) by mouth 2 (two) times daily. 60 tablet 0    spironolactone (ALDACTONE) 100 MG tablet Take 2 tablets (200 mg total) by mouth once daily. 60 tablet 0    tamsulosin (FLOMAX) 0.4 mg Cap tamsulosin 0.4 mg capsule   TAKE 1 CAPSULE BY MOUTH AT BEDTIME      thiamine 100 MG tablet thiamine HCl (vitamin B1) 100 mg tablet   TAKE ONE TABLET BY MOUTH EVERY DAY      albuterol (PROVENTIL/VENTOLIN HFA) 90 mcg/actuation inhaler Inhale into the lungs.      albuterol (VENTOLIN HFA) 90 mcg/actuation inhaler Ventolin HFA 90 mcg/actuation aerosol inhaler   Inhale 2 puffs every 4 hours by inhalation route as needed.      amLODIPine (NORVASC) 5 MG tablet amlodipine 5 mg tablet   Take 1 tablet every day by oral route.      carvediloL (COREG) 3.125 MG tablet carvedilol 3.125 mg tablet      famotidine (PEPCID) 20 MG tablet Take 20 mg by mouth 2 (two) times daily.       fluticasone-salmeterol diskus inhaler 250-50 mcg Advair Diskus 250 mcg-50 mcg/dose powder for inhalation   Inhale 1 puff twice a day by inhalation route.      folic acid (FOLVITE) 1 MG tablet Take 1,000 mcg by mouth once daily.      gabapentin (NEURONTIN) 300 MG capsule gabapentin 300 mg capsule   Take 1 capsule twice a day by oral route for 90 days.      haloperidoL (HALDOL) 1 MG tablet Take 1 mg by mouth every 4 (four) hours as needed.      levothyroxine (SYNTHROID) 200 MCG tablet Take 1 tablet (200 mcg total) by mouth before breakfast. 30 tablet 0    LORazepam (ATIVAN) 1 MG tablet Take 1 mg by mouth every 4 (four) hours as needed.      melatonin 10 mg Cap Take 1 capsule by mouth every evening.      multivitamin Tab multivitamin tablet   TAKE ONE TABLET BY MOUTH ONCE A DAY      ondansetron (ZOFRAN-ODT) 4 MG TbDL Take 8 mg by mouth every 8 (eight) hours as needed.      potassium chloride SA (K-DUR,KLOR-CON) 20 MEQ tablet Take 20 mEq by mouth once daily.      QUEtiapine (SEROQUEL) 100 MG Tab quetiapine 100 mg tablet   Take 1 tablet every day by oral route at bedtime.      tiotropium (SPIRIVA WITH HANDIHALER) 18 mcg inhalation capsule Spiriva with HandiHaler 18 mcg and inhalation capsules      ursodioL (ACTIGALL) 300 mg capsule Take by mouth.       No facility-administered encounter medications on file as of 11/2/2022.     Review of patient's allergies indicates:   Allergen Reactions    Biaxin [clarithromycin] Anaphylaxis    Clindamycin Swelling    Penicillins Swelling    Cephalexin      History reviewed. No pertinent past medical history.    Review of Systems   Constitutional: Negative.    HENT: Negative.     Eyes: Negative.    Respiratory: Negative.     Cardiovascular: Negative.    Gastrointestinal: Negative.    Genitourinary: Negative.    Musculoskeletal: Negative.    Skin: Negative.    Neurological: Negative.    Psychiatric/Behavioral: Negative.     Vitals:    11/02/22 1429   BP: (!) 172/76   Pulse: 86   Resp: 19    Temp: 98.7 °F (37.1 °C)       Physical Exam  Vitals reviewed.   Constitutional:       Appearance: He is well-developed.   HENT:      Head: Normocephalic and atraumatic.   Eyes:      General: No scleral icterus.     Conjunctiva/sclera: Conjunctivae normal.      Pupils: Pupils are equal, round, and reactive to light.   Neck:      Thyroid: No thyromegaly.   Cardiovascular:      Rate and Rhythm: Normal rate and regular rhythm.      Heart sounds: Normal heart sounds.   Pulmonary:      Effort: Pulmonary effort is normal.      Breath sounds: Normal breath sounds. No rales.   Abdominal:      General: Bowel sounds are normal. There is no distension.      Palpations: Abdomen is soft. There is no mass.      Tenderness: There is no abdominal tenderness.   Musculoskeletal:         General: Normal range of motion.      Cervical back: Normal range of motion and neck supple.   Skin:     General: Skin is warm and dry.      Findings: No rash.   Neurological:      Mental Status: He is alert and oriented to person, place, and time.       MELD-Na score: 11 at 7/23/2022  7:16 AM  MELD score: 11 at 7/23/2022  7:16 AM  Calculated from:  Serum Creatinine: 0.8 mg/dL (Using min of 1 mg/dL) at 7/23/2022  7:16 AM  Serum Sodium: 133 mmol/L at 7/23/2022  7:16 AM  Total Bilirubin: 1.1 mg/dL at 7/23/2022  7:16 AM  INR(ratio): 1.5 at 7/23/2022  7:16 AM  Age: 56 years    Lab Results   Component Value Date    GLU 96 07/23/2022    BUN 11 07/23/2022    CREATININE 0.8 07/23/2022    CALCIUM 8.1 (L) 07/23/2022     (L) 07/23/2022    K 3.0 (L) 07/23/2022    CL 96 07/23/2022    PROT 7.2 07/23/2022    CO2 28 07/23/2022    ANIONGAP 9 07/23/2022    WBC 6.01 07/23/2022    RBC 3.28 (L) 07/23/2022    HGB 9.1 (L) 07/23/2022    HCT 29.3 (L) 07/23/2022    MCV 89 07/23/2022    MCH 27.7 07/23/2022    MCHC 31.1 (L) 07/23/2022     Lab Results   Component Value Date    RDW 22.5 (H) 07/23/2022     07/23/2022    MPV 11.0 07/23/2022    GRAN 4.2 07/23/2022     GRAN 69.4 07/23/2022    LYMPH 0.4 (L) 07/23/2022    LYMPH 7.0 (L) 07/23/2022    MONO 1.1 (H) 07/23/2022    MONO 18.1 (H) 07/23/2022    EOSINOPHIL 4.0 07/23/2022    BASOPHIL 1.2 07/23/2022    EOS 0.2 07/23/2022    BASO 0.07 07/23/2022    ALBUMIN 2.3 (L) 07/23/2022     (H) 07/23/2022    ALT 36 07/23/2022    ALKPHOS 372 (H) 07/23/2022    MG 1.8 07/20/2022    LABPROT 15.1 (H) 07/23/2022    INR 1.5 (H) 07/23/2022       Assessment and Plan:  Robert Davila is a 57 y.o. male with decompensated alcohol-induced Cirrhosis  He has a blocked tips/PVT that can not be revised. His MELD is low. There is no indication for liver transplant at present. Will close his transplant episode. I have told the patient this. He now states he is not drinking but has not gone to rehab. I am hopeful that his fluid will resolve if he abstains from alcohol. My current recommendations:  Cirrhosis: check meld labs every 12 weeks; EGD to screen for varices; HCC screening with abdo US and AFP every 6 months (next due 01/23)  Alcohol abuse: stay off alcohol and attend rehab  Ascites: continue lasix and aldactone; consider increasing diuretics or adding metolazone if he has a need for paracentesis  HE, ongoing: continue HE meds; avoid narcotics/sedatives    Discussed with Dr Claudia MACHADO Patient Status  Functional Status: 80% - Normal activity with effort: some symptoms of disease  Physical Capacity: No Limitations    Diabetes: No  Any previous malignancy: No  Neoadjuvant Therapy: no  Has patient ever had a dx of HCC: no  Previous Abdominal Surgery: no  Spontaneous Bacterial Peritonitis: no  History of Portal Vein Thrombosis: yes  Transjugular Intrahepatic Portosystemic Shunt: yes

## 2022-11-03 ENCOUNTER — TELEPHONE (OUTPATIENT)
Dept: TRANSPLANT | Facility: CLINIC | Age: 57
End: 2022-11-03
Payer: MEDICARE

## 2022-11-03 ENCOUNTER — TELEPHONE (OUTPATIENT)
Dept: HEPATOLOGY | Facility: CLINIC | Age: 57
End: 2022-11-03
Payer: MEDICARE

## 2022-11-03 PROBLEM — K70.31 ALCOHOLIC CIRRHOSIS OF LIVER WITH ASCITES: Status: ACTIVE | Noted: 2022-11-03

## 2022-11-03 PROBLEM — K76.82 HEPATIC ENCEPHALOPATHY: Status: ACTIVE | Noted: 2022-11-03

## 2022-11-03 PROBLEM — I81 PVT (PORTAL VEIN THROMBOSIS): Status: ACTIVE | Noted: 2022-11-03

## 2022-11-03 NOTE — TELEPHONE ENCOUNTER
Will reschedule missed appt accordingly.    =========================================    ----- Message from Mark Orozco RN sent at 11/2/2022  5:07 PM CDT -----  Regarding: FW: Update    ----- Message -----  From: Fawn Delarosa  Sent: 11/2/2022   4:57 PM CDT  To: [facility] Pre-Liver Transplant Clinical  Subject: Update                                           Pt is traveling from [place] and they got lost on their way to their appt today with Dr Espinoza. Pt will most likely need to reschedule.       Cassie (Pt's Friend) @ [phone]

## 2022-11-03 NOTE — TELEPHONE ENCOUNTER
Call to Mr. Jones voice message left that he can call me directly at [phone]    ----- Message from Bobbi Luis MD sent at 11/2/2022  6:45 PM CDT -----  MELD-Na score: 11   Please call pt and let him know MELD is <15 and therefore liver transplant eval not indicated

## 2023-02-23 ENCOUNTER — TELEPHONE (OUTPATIENT)
Dept: TRANSPLANT | Facility: CLINIC | Age: 58
End: 2023-02-23
Payer: MEDICARE

## 2023-02-23 NOTE — TELEPHONE ENCOUNTER
Returned call to Kelly. Pt's son is interested in donating a portion of his LIVER - not kidney.     Per Kelly, Robert has been listed at [facility] before & is currently in a hospice facility in [place]. His son wishes to donate a portion of his liver to Robert.    Explained to Kelly that I cannot find record of Robert listed at [facility]. Explained that our protocol required a patient to come to our facility, be evaluated and listed. Once listed, we can begin the process of evaluating living donors.     Explained to Kelly that patients cannot be in hospice/DNR and also waitlisted for liver transplant. She asked how to get pt to [facility] for eval. Advised her to talk with her son's MD team at the hospital he is in about transferring to [facility]. Provided hospital 's number. Kelly to discuss with son. No other questions at this time.     ----- Message -----  From: Fawn Delarosa  Sent: 2/23/2023   3:17 PM CST  To: [facility] Pre-Liver Transplant Clinical  Subject: Questions                                        Pt's mother wants to speak with a nurse. Pt is in the hospital. She wants the pt to be transferred to the Transplant institute at [facility]. Pt's son is willing to donate their kidney.      Kelly (Mother) @ [phone]

## 2023-04-05 NOTE — TELEPHONE ENCOUNTER
Spoke with the patient who reports that he is not coming because of bad weather.  Brooke requested that his appointments be rescheduled.  
Where Is Your Wart Located?: Left palm
Additional Comments (Use Complete Sentences): \\n\\nPt reports wart on left palm that appeared a couple months ago.